# Patient Record
Sex: FEMALE | Race: WHITE | NOT HISPANIC OR LATINO | Employment: STUDENT | URBAN - METROPOLITAN AREA
[De-identification: names, ages, dates, MRNs, and addresses within clinical notes are randomized per-mention and may not be internally consistent; named-entity substitution may affect disease eponyms.]

---
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
## 2019-01-11 ENCOUNTER — OFFICE VISIT (OUTPATIENT)
Dept: FAMILY MEDICINE | Facility: CLINIC | Age: 19
End: 2019-01-11
Payer: MEDICAID

## 2019-01-11 ENCOUNTER — ANCILLARY PROCEDURE (OUTPATIENT)
Dept: GENERAL RADIOLOGY | Facility: CLINIC | Age: 19
End: 2019-01-11
Payer: MEDICAID

## 2019-01-11 VITALS
HEIGHT: 68 IN | SYSTOLIC BLOOD PRESSURE: 127 MMHG | HEART RATE: 100 BPM | TEMPERATURE: 98.9 F | BODY MASS INDEX: 35.22 KG/M2 | OXYGEN SATURATION: 95 % | WEIGHT: 232.4 LBS | DIASTOLIC BLOOD PRESSURE: 82 MMHG

## 2019-01-11 DIAGNOSIS — R05.9 COUGH: ICD-10-CM

## 2019-01-11 DIAGNOSIS — J18.9 WALKING PNEUMONIA: Primary | ICD-10-CM

## 2019-01-11 DIAGNOSIS — Z91.09 ALLERGY TO MOLD SPORES: ICD-10-CM

## 2019-01-11 PROCEDURE — 71046 X-RAY EXAM CHEST 2 VIEWS: CPT | Mod: FY

## 2019-01-11 PROCEDURE — 99214 OFFICE O/P EST MOD 30 MIN: CPT | Performed by: FAMILY MEDICINE

## 2019-01-11 RX ORDER — FLUTICASONE PROPIONATE 50 MCG
1 SPRAY, SUSPENSION (ML) NASAL DAILY
Qty: 15.8 ML | Refills: 3 | Status: SHIPPED | OUTPATIENT
Start: 2019-01-11 | End: 2019-05-17

## 2019-01-11 RX ORDER — AZITHROMYCIN 250 MG/1
TABLET, FILM COATED ORAL
Qty: 6 TABLET | Refills: 0 | Status: SHIPPED | OUTPATIENT
Start: 2019-01-11 | End: 2019-05-17

## 2019-01-11 RX ORDER — CODEINE PHOSPHATE AND GUAIFENESIN 10; 100 MG/5ML; MG/5ML
1 SOLUTION ORAL EVERY 4 HOURS PRN
Qty: 118 ML | Refills: 0 | Status: SHIPPED | OUTPATIENT
Start: 2019-01-11 | End: 2019-05-17

## 2019-01-11 ASSESSMENT — MIFFLIN-ST. JEOR: SCORE: 1877.66

## 2019-01-11 NOTE — PROGRESS NOTES
SUBJECTIVE:   Rachelle Mckeon is a 18 year old female who presents to clinic today for the following health issues:      ENT Symptoms             Symptoms: cc Present Absent Comment   Fever/Chills  x  Chills    Fatigue  x     Muscle Aches  x  Slight    Eye Irritation  x  Slight irritation    Sneezing  x     Nasal Alvin/Drg  x  Nasal congestion and drainage    Sinus Pressure/Pain   x    Loss of smell   x    Dental pain   x    Sore Throat  x     Swollen Glands   x    Ear Pain/Fullness   x    Cough  x     Wheeze  x     Chest Pain  x  Slight pain    Shortness of breath  x     Rash   x    Other  x  Slight nausea after coughing      Symptom duration:  a little over a month    Symptom severity:  mild    Treatments tried:  Nyquil and OTC flu and cold medication Robitussin with codeine    Contacts:  none      Symptoms ongoing for over a month  Cough  Chest and back pain when coughing   Some shortness of breath   No wheezing   No hemoptysis    No asthma  Mold allergy - on singulair.   Recently ran out of flonase - stopped taking it a few days ago     Review of systems:    No fever/chills  No facial pain   No ear pain  Some throat pain   No rash   Normal appetite  No n/v   No diarrhea/constipation       Problem list and histories reviewed & adjusted, as indicated.  Additional history: as documented      Patient Active Problem List   Diagnosis     Health Care Home     Moderate major depression (H)     Anxiety     Mood disorder (H)     Nonspecific finding on examination of urine     Migraine with aura and without status migrainosus, not intractable     Current Outpatient Medications   Medication     amitriptyline (ELAVIL) 25 MG tablet     amphetamine-dextroamphetamine (ADDERALL XR) 20 MG per capsule     Biotin 29011 MCG TABS     Cholecalciferol (VITAMIN D PO)     MONO-LINYAH 0.25-35 MG-MCG per tablet     montelukast (SINGULAIR) 10 MG tablet     Fluticasone Propionate (FLONASE NA)     IBUPROFEN PO     SUMAtriptan (IMITREX) 25 MG  "tablet     traZODone (DESYREL) 50 MG tablet     No current facility-administered medications for this visit.         Allergies   Allergen Reactions     Mold      Wool Fiber Hives       /82 (BP Location: Right arm, Patient Position: Sitting, Cuff Size: Adult Regular)   Pulse 100   Temp 98.9  F (37.2  C) (Tympanic)   Ht 1.727 m (5' 8\")   Wt 105.4 kg (232 lb 6.4 oz)   SpO2 95%   BMI 35.34 kg/m      GENERAL - Pt is alert and oriented in no acute distress.  Affect is appropriate. Good eye contact. Looks tired   HEET - Head is normocephalic, atraumatic.    PERRLA,EEMI. Conjunctiva are free of icterus or erythema.    TMs bilaterally normal.   Oropharynx free of masses and lesions, no tonsillar exudate or petechiae.    NECK - Neck is supple w/o LA or thyromegaly  RESPIRATORY - Clear to auscultation bilaterally.  No wheezing noted  CV - RRR, no murmurs, rubs, gallops.   EXTREM - No edema.      chest x-ray   I independently visualized the xray and my findings were negative.   Radiology review pending.        Assessment/Plan -    (J18.9) Walking pneumonia  (primary encounter diagnosis)  Comment: due to ongoing symptoms and low sats, will treat for walking pneumonia with zpak. Discussed risks/benefits/side effects with the patient of taking robitussin AC and she would like to have some in case the cough is bad at night. Return to clinic if symptoms persist/change/worsen. The patient indicates understanding of these issues and agrees with the plan.   Plan: azithromycin (ZITHROMAX) 250 MG tablet,         guaiFENesin-codeine (ROBITUSSIN AC) 100-10         MG/5ML solution            (R05) Cough  Comment:   Plan: XR Chest 2 Views            (Z91.09) Allergy to mold spores  Comment: restart flonase and use throughout the winter season   Plan: fluticasone (FLONASE) 50 MCG/ACT nasal spray            KITTY Olguin MD   "

## 2019-04-16 DIAGNOSIS — Z30.41 ORAL CONTRACEPTIVE PILL SURVEILLANCE: ICD-10-CM

## 2019-04-16 RX ORDER — NORGESTIMATE AND ETHINYL ESTRADIOL 0.25-0.035
KIT ORAL
Qty: 84 TABLET | Refills: 3 | Status: SHIPPED | OUTPATIENT
Start: 2019-04-16 | End: 2019-12-18

## 2019-04-16 NOTE — TELEPHONE ENCOUNTER
"MONO-LINYAH 0.25-35 MG-MCG TABS      Last Written Prescription Date:  8/1/18  Last Fill Quantity: 84,   # refills: 3  Last Office Visit: 1/11/19  Future Office visit:       Requested Prescriptions   Pending Prescriptions Disp Refills     MONO-LINYAH 0.25-35 MG-MCG tablet [Pharmacy Med Name: MONO-LINYAH 0.25-35 MG-MCG TABS] 84 tablet 3     Sig: TAKE ONE TABLET BY MOUTH EVERY DAY MAY SKIP PLACEBO TABLETS.       Contraceptives Protocol Passed - 4/16/2019  1:18 PM        Passed - Patient is not a current smoker if age is 35 or older        Passed - Recent (12 mo) or future (30 days) visit within the authorizing provider's specialty     Patient had office visit in the last 12 months or has a visit in the next 30 days with authorizing provider or within the authorizing provider's specialty.  See \"Patient Info\" tab in inbasket, or \"Choose Columns\" in Meds & Orders section of the refill encounter.              Passed - Medication is active on med list        Passed - No active pregnancy on record        Passed - No positive pregnancy test in past 12 months          "

## 2019-05-17 ENCOUNTER — OFFICE VISIT (OUTPATIENT)
Dept: FAMILY MEDICINE | Facility: CLINIC | Age: 19
End: 2019-05-17
Payer: COMMERCIAL

## 2019-05-17 VITALS
RESPIRATION RATE: 14 BRPM | WEIGHT: 230.3 LBS | DIASTOLIC BLOOD PRESSURE: 83 MMHG | BODY MASS INDEX: 34.9 KG/M2 | HEART RATE: 85 BPM | HEIGHT: 68 IN | OXYGEN SATURATION: 95 % | TEMPERATURE: 97.8 F | SYSTOLIC BLOOD PRESSURE: 125 MMHG

## 2019-05-17 DIAGNOSIS — N76.0 BACTERIAL VAGINOSIS: ICD-10-CM

## 2019-05-17 DIAGNOSIS — N30.00 ACUTE CYSTITIS WITHOUT HEMATURIA: Primary | ICD-10-CM

## 2019-05-17 DIAGNOSIS — Z91.09 ALLERGY TO MOLD SPORES: ICD-10-CM

## 2019-05-17 DIAGNOSIS — J30.1 SEASONAL ALLERGIC RHINITIS DUE TO POLLEN: ICD-10-CM

## 2019-05-17 DIAGNOSIS — H69.93 DYSFUNCTION OF BOTH EUSTACHIAN TUBES: ICD-10-CM

## 2019-05-17 DIAGNOSIS — B96.89 BACTERIAL VAGINOSIS: ICD-10-CM

## 2019-05-17 DIAGNOSIS — J98.01 ACUTE BRONCHOSPASM: ICD-10-CM

## 2019-05-17 DIAGNOSIS — N89.8 VAGINAL DISCHARGE: ICD-10-CM

## 2019-05-17 DIAGNOSIS — R35.0 URINARY FREQUENCY: ICD-10-CM

## 2019-05-17 DIAGNOSIS — F32.1 MODERATE MAJOR DEPRESSION (H): ICD-10-CM

## 2019-05-17 LAB
ALBUMIN UR-MCNC: 30 MG/DL
APPEARANCE UR: CLEAR
BACTERIA #/AREA URNS HPF: ABNORMAL /HPF
BILIRUB UR QL STRIP: NEGATIVE
COLOR UR AUTO: YELLOW
GLUCOSE UR STRIP-MCNC: NEGATIVE MG/DL
HGB UR QL STRIP: ABNORMAL
KETONES UR STRIP-MCNC: NEGATIVE MG/DL
LEUKOCYTE ESTERASE UR QL STRIP: NEGATIVE
NITRATE UR QL: NEGATIVE
NON-SQ EPI CELLS #/AREA URNS LPF: ABNORMAL /LPF
PH UR STRIP: 6 PH (ref 5–7)
RBC #/AREA URNS AUTO: ABNORMAL /HPF
SOURCE: ABNORMAL
SP GR UR STRIP: 1.02 (ref 1–1.03)
SPECIMEN SOURCE: ABNORMAL
UROBILINOGEN UR STRIP-ACNC: 1 EU/DL (ref 0.2–1)
WBC #/AREA URNS AUTO: ABNORMAL /HPF
WET PREP SPEC: ABNORMAL

## 2019-05-17 PROCEDURE — 87210 SMEAR WET MOUNT SALINE/INK: CPT | Performed by: FAMILY MEDICINE

## 2019-05-17 PROCEDURE — 87086 URINE CULTURE/COLONY COUNT: CPT | Performed by: FAMILY MEDICINE

## 2019-05-17 PROCEDURE — 99214 OFFICE O/P EST MOD 30 MIN: CPT | Performed by: FAMILY MEDICINE

## 2019-05-17 PROCEDURE — 81001 URINALYSIS AUTO W/SCOPE: CPT | Performed by: FAMILY MEDICINE

## 2019-05-17 RX ORDER — CETIRIZINE HYDROCHLORIDE 10 MG/1
10 TABLET ORAL DAILY
Qty: 30 TABLET | Refills: 3 | Status: SHIPPED | OUTPATIENT
Start: 2019-05-17 | End: 2019-07-31

## 2019-05-17 RX ORDER — PREDNISONE 20 MG/1
40 TABLET ORAL DAILY
Qty: 10 TABLET | Refills: 0 | Status: SHIPPED | OUTPATIENT
Start: 2019-05-17 | End: 2019-06-05

## 2019-05-17 RX ORDER — FLUTICASONE PROPIONATE 50 MCG
1 SPRAY, SUSPENSION (ML) NASAL DAILY
Qty: 15.8 ML | Refills: 3 | Status: SHIPPED | OUTPATIENT
Start: 2019-05-17

## 2019-05-17 RX ORDER — SULFAMETHOXAZOLE/TRIMETHOPRIM 800-160 MG
1 TABLET ORAL 2 TIMES DAILY
Qty: 6 TABLET | Refills: 0 | Status: SHIPPED | OUTPATIENT
Start: 2019-05-17 | End: 2019-06-05

## 2019-05-17 RX ORDER — ALBUTEROL SULFATE 90 UG/1
2 AEROSOL, METERED RESPIRATORY (INHALATION) EVERY 6 HOURS
Qty: 8.5 G | Refills: 0 | Status: SHIPPED | OUTPATIENT
Start: 2019-05-17

## 2019-05-17 ASSESSMENT — ANXIETY QUESTIONNAIRES
2. NOT BEING ABLE TO STOP OR CONTROL WORRYING: NEARLY EVERY DAY
6. BECOMING EASILY ANNOYED OR IRRITABLE: MORE THAN HALF THE DAYS
3. WORRYING TOO MUCH ABOUT DIFFERENT THINGS: NEARLY EVERY DAY
5. BEING SO RESTLESS THAT IT IS HARD TO SIT STILL: SEVERAL DAYS
GAD7 TOTAL SCORE: 16
7. FEELING AFRAID AS IF SOMETHING AWFUL MIGHT HAPPEN: MORE THAN HALF THE DAYS
1. FEELING NERVOUS, ANXIOUS, OR ON EDGE: NEARLY EVERY DAY

## 2019-05-17 ASSESSMENT — PATIENT HEALTH QUESTIONNAIRE - PHQ9
SUM OF ALL RESPONSES TO PHQ QUESTIONS 1-9: 14
5. POOR APPETITE OR OVEREATING: MORE THAN HALF THE DAYS

## 2019-05-17 ASSESSMENT — MIFFLIN-ST. JEOR: SCORE: 1868.13

## 2019-05-17 NOTE — PROGRESS NOTES
SUBJECTIVE:   Rachelle Mckeon is a 18 year old female who presents to clinic today for the following   health issues:      ENT Symptoms             Symptoms: cc Present Absent Comment   Fever/Chills   x    Fatigue  x  Slight    Muscle Aches  x  Neck up    Eye Irritation  x  Irritation    Sneezing  x     Nasal Alvin/Drg  x  Nasal drainage    Sinus Pressure/Pain  x  Slight pressure    Loss of smell  x  On and off    Dental pain   x    Sore Throat  x  Slight    Swollen Glands   x    Ear Pain/Fullness  x  Bilateral - left ear is worse    Cough  x  Slight    Wheeze   x    Chest Pain   x    Shortness of breath  x     Rash   x    Other  x  Waking with dry mouth    vomited on Wednesday      Symptom duration:  past month    Symptom severity:  moderate    Treatments tried:  none    Contacts:  works with public      Allergies - mold/wool  Taking singulair and intermittent flonase   Denies asthma  Is coughing and short of breath  No f/c     Non smoker     URINARY TRACT SYMPTOMS  Onset: since Tuesday     Description:   Painful urination (Dysuria): YES- slight   Blood in urine (Hematuria): no   Delay in urine (Hesitency): YES- sometimes    Frequency- yes     Intensity: mild    Progression of Symptoms:  same    Accompanying Signs & Symptoms:  Fever/chills: no   Flank pain YES- usual for patient per patient   Nausea and vomiting: YES- vomited on Wednesday   Any vaginal symptoms: vaginal itching - slight. No odor. No discharge   Abdominal/Pelvic Pain: YES- abdominal     History:   History of frequent UTI's: no   History of kidney stones: no   Sexually Active: no   Possibility of pregnancy: No    Precipitating factors:   Therapies Tried and outcome: Urinestat     Review of systems:  No f/c   No n/v   No flank pain  No diarrhea/constipation     Additional history: as documented    Patient Active Problem List   Diagnosis     Health Care Home     Moderate major depression (H)     Anxiety     Mood disorder (H)     Nonspecific finding on  "examination of urine     Migraine with aura and without status migrainosus, not intractable     Current Outpatient Medications   Medication     amitriptyline (ELAVIL) 25 MG tablet     amphetamine-dextroamphetamine (ADDERALL XR) 20 MG per capsule     Biotin 15461 MCG TABS     Cholecalciferol (VITAMIN D PO)     fluticasone (FLONASE) 50 MCG/ACT nasal spray     MONO-LINYAH 0.25-35 MG-MCG tablet     montelukast (SINGULAIR) 10 MG tablet     traZODone (DESYREL) 50 MG tablet     Fluticasone Propionate (FLONASE NA)     IBUPROFEN PO     SUMAtriptan (IMITREX) 25 MG tablet     No current facility-administered medications for this visit.         Allergies   Allergen Reactions     Mold      Wool Fiber Hives       /83 (BP Location: Right arm, Patient Position: Sitting, Cuff Size: Adult Large)   Pulse 85   Temp 97.8  F (36.6  C) (Tympanic)   Resp 14   Ht 1.727 m (5' 8\")   Wt 104.5 kg (230 lb 4.8 oz)   SpO2 95%   BMI 35.02 kg/m    GENERAL - Pt is alert and oriented in no acute distress.  Affect is appropriate. Good eye contact.  HEET - Head is normocephalic, atraumatic.  PERRLA,EEMI. Conjunctiva are free of icterus or erythema.    TMs bilaterally normal.  Oropharynx free of masses and lesions, no tonsillar exudate or petechiae.    NECK - Neck is supple w/o LA or thyromegaly  RESPIRATORY - bilateral end expiratory wheezes throughout noted  CV - RRR, no murmurs, rubs, gallops.  ABD - +BS, soft, nontender, no rebound, no guarding. No palpable organomegaly.  BACK - No CVA tenderness.       Results for orders placed or performed in visit on 05/17/19   UA reflex to Microscopic and Culture   Result Value Ref Range    Color Urine Yellow     Appearance Urine Clear     Glucose Urine Negative NEG^Negative mg/dL    Bilirubin Urine Negative NEG^Negative    Ketones Urine Negative NEG^Negative mg/dL    Specific Gravity Urine 1.025 1.003 - 1.035    Blood Urine Moderate (A) NEG^Negative    pH Urine 6.0 5.0 - 7.0 pH    Protein Albumin " Urine 30 (A) NEG^Negative mg/dL    Urobilinogen Urine 1.0 0.2 - 1.0 EU/dL    Nitrite Urine Negative NEG^Negative    Leukocyte Esterase Urine Negative NEG^Negative    Source Midstream Urine    Urine Microscopic   Result Value Ref Range    WBC Urine 5-10 (A) OTO5^0 - 5 /HPF    RBC Urine 2-5 (A) OTO2^O - 2 /HPF    Squamous Epithelial /LPF Urine Moderate (A) FEW^Few /LPF    Bacteria Urine Moderate (A) NEG^Negative /HPF   Wet prep   Result Value Ref Range    Specimen Description Vagina     Wet Prep No Trichomonas seen     Wet Prep No yeast seen     Wet Prep Moderate  Clue cells seen   (A)     Wet Prep Moderate  WBC'S seen            Assessment/Plan -    (N30.00) Acute cystitis without hematuria  (primary encounter diagnosis)  Comment: discussed diagnosis and treatment. Urine culture ordered. The patient indicates understanding of these issues and agrees with the plan.   Plan: sulfamethoxazole-trimethoprim (BACTRIM         DS/SEPTRA DS) 800-160 MG tablet            (R35.0) Urinary frequency  Comment:   Plan: UA reflex to Microscopic and Culture, Urine         Microscopic            (N89.8) Vaginal discharge  Comment:   Plan: Wet prep            (F32.1) Moderate major depression (H)  Comment: stable   Plan:     (J30.1) Seasonal allergic rhinitis due to pollen  Comment: I think her facial symptoms are due to allergies. Start zyrtec. Continue singulair. Use flonase regularly. The patient indicates understanding of these issues and agrees with the plan.   Plan: cetirizine (ZYRTEC) 10 MG tablet            (H69.83) Dysfunction of both eustachian tubes  Comment:  Should improve with allergy treatment   Plan:     (J98.01) Acute bronchospasm  Comment: will treat with prednisone burst - Discussed risks/benefits/side effects with the patient. Inhaler given for prn use. Return to clinic if symptoms persist/change/worsen   Plan: predniSONE (DELTASONE) 20 MG tablet, albuterol         (PROAIR HFA/PROVENTIL HFA/VENTOLIN HFA) 108 (90         Base) MCG/ACT inhaler            (Z91.09) Allergy to mold spores  Comment:   Plan: fluticasone (FLONASE) 50 MCG/ACT nasal spray            (N76.0,  B96.89) Bacterial vaginosis  Comment: discussed that there are clue cells. But she also has a significant UTI right now. Would recommend that we treat for UTI first and if vaginal symptoms continue, will treat for BV. She will let me know next week how she is feeling.   Plan:     KITTY Olguin MD

## 2019-05-18 LAB
BACTERIA SPEC CULT: NORMAL
SPECIMEN SOURCE: NORMAL

## 2019-05-18 ASSESSMENT — ANXIETY QUESTIONNAIRES: GAD7 TOTAL SCORE: 16

## 2019-06-05 ENCOUNTER — OFFICE VISIT (OUTPATIENT)
Dept: FAMILY MEDICINE | Facility: CLINIC | Age: 19
End: 2019-06-05
Payer: COMMERCIAL

## 2019-06-05 VITALS
RESPIRATION RATE: 18 BRPM | HEART RATE: 92 BPM | SYSTOLIC BLOOD PRESSURE: 129 MMHG | DIASTOLIC BLOOD PRESSURE: 82 MMHG | BODY MASS INDEX: 35.33 KG/M2 | OXYGEN SATURATION: 96 % | HEIGHT: 68 IN | TEMPERATURE: 98.2 F | WEIGHT: 233.1 LBS

## 2019-06-05 DIAGNOSIS — J01.00 ACUTE NON-RECURRENT MAXILLARY SINUSITIS: ICD-10-CM

## 2019-06-05 DIAGNOSIS — R07.0 THROAT PAIN: Primary | ICD-10-CM

## 2019-06-05 LAB
DEPRECATED S PYO AG THROAT QL EIA: NORMAL
SPECIMEN SOURCE: NORMAL

## 2019-06-05 PROCEDURE — 87880 STREP A ASSAY W/OPTIC: CPT | Performed by: FAMILY MEDICINE

## 2019-06-05 PROCEDURE — 87070 CULTURE OTHR SPECIMN AEROBIC: CPT | Performed by: FAMILY MEDICINE

## 2019-06-05 PROCEDURE — 99213 OFFICE O/P EST LOW 20 MIN: CPT | Performed by: FAMILY MEDICINE

## 2019-06-05 RX ORDER — AMOXICILLIN 500 MG/1
500 CAPSULE ORAL 3 TIMES DAILY
Qty: 30 CAPSULE | Refills: 0 | Status: SHIPPED | OUTPATIENT
Start: 2019-06-05 | End: 2019-07-31

## 2019-06-05 ASSESSMENT — MIFFLIN-ST. JEOR: SCORE: 1880.83

## 2019-06-05 ASSESSMENT — PAIN SCALES - GENERAL: PAINLEVEL: NO PAIN (0)

## 2019-06-05 NOTE — PROGRESS NOTES
SUBJECTIVE:                                                    Rachelle Mckeon is a 18 year old female who presents to clinic today for the following health issues:    ENT Symptoms             Symptoms: cc Present Absent Comment   Fever/Chills  x  Chills slightly   Fatigue  x     Muscle Aches  x     Eye Irritation  x  Itchy eyes   Sneezing  x     Nasal Alvin/Drg  x     Sinus Pressure/Pain  x     Loss of smell  x     Dental pain   x    Sore Throat  x     Swollen Glands  x     Ear Pain/Fullness x x     Cough  x     Wheeze  x     Chest Pain  x  slightly   Shortness of breath   x    Rash   x    Other x x  headache     Symptom duration:  Four days   Symptom severity:  moderate   Treatments tried:  none, last month she was given an antibiotic due to having sinus pressure.    Contacts:  mom and step dad     Problem list and histories reviewed & adjusted, as indicated.  Additional history:     Patient Active Problem List   Diagnosis     Health Care Home     Moderate major depression (H)     Anxiety     Mood disorder (H)     Migraine with aura and without status migrainosus, not intractable     Past Surgical History:   Procedure Laterality Date     NO HISTORY OF SURGERY         Social History     Tobacco Use     Smoking status: Passive Smoke Exposure - Never Smoker     Smokeless tobacco: Never Used     Tobacco comment: Stepmother- outside and every other weekend with dad.    Substance Use Topics     Alcohol use: No     History reviewed. No pertinent family history.      Current Outpatient Medications   Medication Sig Dispense Refill     albuterol (PROAIR HFA/PROVENTIL HFA/VENTOLIN HFA) 108 (90 Base) MCG/ACT inhaler Inhale 2 puffs into the lungs every 6 hours 8.5 g 0     amitriptyline (ELAVIL) 25 MG tablet        amphetamine-dextroamphetamine (ADDERALL XR) 20 MG per capsule        Biotin 82146 MCG TABS        cetirizine (ZYRTEC) 10 MG tablet Take 1 tablet (10 mg) by mouth daily 30 tablet 3     Cholecalciferol (VITAMIN D PO) Take  "4,000 Units by mouth daily       fluticasone (FLONASE) 50 MCG/ACT nasal spray Spray 1 spray into both nostrils daily 15.8 mL 3     Fluticasone Propionate (FLONASE NA)        MONO-LINYAH 0.25-35 MG-MCG tablet TAKE ONE TABLET BY MOUTH EVERY DAY MAY SKIP PLACEBO TABLETS. 84 tablet 3     montelukast (SINGULAIR) 10 MG tablet Take 1 tablet (10 mg) by mouth At Bedtime 30 tablet 5     SUMAtriptan (IMITREX) 25 MG tablet Take 1-2 tablets (25-50 mg) by mouth at onset of headache for migraine May repeat in 2 hours. Max 8 tablets/24 hours. 18 tablet 1     traZODone (DESYREL) 50 MG tablet Take 50 mg by mouth nightly as needed for sleep       IBUPROFEN PO Take 400 mg by mouth every 6 hours as needed for moderate pain       Allergies   Allergen Reactions     Mold      Wool Fiber Hives       ROS:Constitutional, HEENT, cardiovascular, pulmonary, gi and gu systems are negative, except as otherwise noted..    OBJECTIVE:                                 /82 (BP Location: Right arm, Patient Position: Sitting, Cuff Size: Adult Large)   Pulse 92   Temp 98.2  F (36.8  C) (Tympanic)   Resp 18   Ht 1.727 m (5' 8\")   Wt 105.7 kg (233 lb 1.6 oz)   SpO2 96%   BMI 35.44 kg/m   Body mass index is 35.44 kg/m .{GENERAL: healthy, alert, well nourished, well hydrated, no distress  HENT: ear canals- normal; TMs- normal; Nose- normal; Mouth- no ulcers, no lesions  NECK: no tenderness, no adenopathy, no asymmetry, no masses, no stiffness; thyroid- normal to palpation  RESP: lungs clear to auscultation - no rales, no rhonchi, no wheezes  CV: regular rates and rhythm, normal S1 S2, no S3 or S4 and no murmur, no click or rub -  ABDOMEN: soft, no tenderness, no  hepatosplenomegaly, no masses, normal bowel sounds  ASSESSMENT/PLAN:   (R07.0) Throat pain  (primary encounter diagnosis)  Plan: Strep, Rapid Screen, Throat Culture Aerobic         Bacterial      (J01.00) Acute non-recurrent maxillary sinusitis  Comment: ,return to clinic or call    Plan: " amoxicillin (AMOXIL) 500 MG capsule

## 2019-06-07 LAB
BACTERIA SPEC CULT: NORMAL
Lab: NORMAL
SPECIMEN SOURCE: NORMAL

## 2019-06-09 ENCOUNTER — HOSPITAL ENCOUNTER (EMERGENCY)
Facility: CLINIC | Age: 19
Discharge: HOME | End: 2019-06-09
Attending: PHYSICIAN ASSISTANT | Admitting: PHYSICIAN ASSISTANT
Payer: COMMERCIAL

## 2019-06-09 ENCOUNTER — APPOINTMENT (OUTPATIENT)
Dept: GENERAL RADIOLOGY | Facility: CLINIC | Age: 19
End: 2019-06-09
Attending: PHYSICIAN ASSISTANT
Payer: COMMERCIAL

## 2019-06-09 VITALS
RESPIRATION RATE: 16 BRPM | TEMPERATURE: 97.8 F | OXYGEN SATURATION: 97 % | SYSTOLIC BLOOD PRESSURE: 138 MMHG | WEIGHT: 200 LBS | BODY MASS INDEX: 30.31 KG/M2 | HEART RATE: 87 BPM | DIASTOLIC BLOOD PRESSURE: 86 MMHG | HEIGHT: 68 IN

## 2019-06-09 DIAGNOSIS — S91.331A PUNCTURE WOUND OF RIGHT FOOT, INITIAL ENCOUNTER: ICD-10-CM

## 2019-06-09 PROCEDURE — 99213 OFFICE O/P EST LOW 20 MIN: CPT | Mod: Z6 | Performed by: PHYSICIAN ASSISTANT

## 2019-06-09 PROCEDURE — G0463 HOSPITAL OUTPT CLINIC VISIT: HCPCS | Performed by: PHYSICIAN ASSISTANT

## 2019-06-09 PROCEDURE — 73630 X-RAY EXAM OF FOOT: CPT | Mod: RT

## 2019-06-09 ASSESSMENT — MIFFLIN-ST. JEOR: SCORE: 1730.69

## 2019-06-09 ASSESSMENT — ENCOUNTER SYMPTOMS
CONSTITUTIONAL NEGATIVE: 1
WOUND: 1

## 2019-06-09 NOTE — ED PROVIDER NOTES
History     Chief Complaint   Patient presents with     Foot Pain     stepped on a nail last night     HPI  April Linda is a 18 year old female who presents with complaints of puncture wound to right foot last night.  Patient states she accidentally stepped on a nail that had fallen into the carpet from trim in her house.  She was barefoot at the time.  Patient's last tetanus was in 2012.  She has been ambulating without difficulties.  Patient complains of some mild pain to the area.  No fevers or chills.      Allergies:  Allergies   Allergen Reactions     Mold      Wool Fiber Hives       Problem List:    Patient Active Problem List    Diagnosis Date Noted     Migraine with aura and without status migrainosus, not intractable 12/06/2017     Priority: Medium     Mood disorder (H) 12/07/2015     Priority: Medium     Health Care Home 02/28/2014     Priority: Medium     EMERGENCY CARE PLAN  February 28, 2014: No current Care Coordination follow up planned. Please refer if Care Coordination services are needed.    Presenting Problem Signs and Symptoms Treatment Plan   Questions or concerns   during clinic hours   I will call my clinic directly:  [address] [address]  [phone].    Questions or concerns outside clinic hours   I will call the 24 hour nurse line at   [phone] or [facility].   Need to schedule an appointment   I will call the 24 hour scheduling team at [phone] or my clinic directly at [phone].    Same day treatment     I will call my clinic first, nurse line if after hours, urgent care and express care if needed.   Clinic care coordination services (regular clinic hours)     I will call a clinic care coordinator directly:     Mateo Hooks RN  Mon, Tues, Fri - [phone]  Wed, Thurs - [phone]    Elaina Fuentes SW:    [phone]    Or call my clinic at [phone] and ask to speak with care coordination.   Crisis Services: Behavioral or  "Mental Health  C[facility]is [facility] 24 Hour Phone Line  [phone]    [facility] 24 Hour Crisis Services  [phone]    [facility] [phone]    [facility]   [phone]       Emergency treatment -- Immediately    [facility] 911          Moderate major depression (H) 02/28/2014     Priority: Medium     Anxiety 02/28/2014     Priority: Medium        Past Medical History:    Past Medical History:   Diagnosis Date     NO ACTIVE PROBLEMS        Past Surgical History:    Past Surgical History:   Procedure Laterality Date     NO HISTORY OF SURGERY         Family History:    No family history on file.    Social History:  Marital Status:  Single [1]  Social History     Tobacco Use     Smoking status: Passive Smoke Exposure - Never Smoker     Smokeless tobacco: Never Used     Tobacco comment: Stepmother- outside and every other weekend with dad.    Substance Use Topics     Alcohol use: No     Drug use: No        Medications:      albuterol (PROAIR HFA/PROVENTIL HFA/VENTOLIN HFA) 108 (90 Base) MCG/ACT inhaler   amitriptyline (ELAVIL) 25 MG tablet   amoxicillin (AMOXIL) 500 MG capsule   amphetamine-dextroamphetamine (ADDERALL XR) 20 MG per capsule   Biotin 54609 MCG TABS   cetirizine (ZYRTEC) 10 MG tablet   Cholecalciferol (VITAMIN D PO)   fluticasone (FLONASE) 50 MCG/ACT nasal spray   Fluticasone Propionate (FLONASE NA)   IBUPROFEN PO   MONO-LINYAH 0.25-35 MG-MCG tablet   montelukast (SINGULAIR) 10 MG tablet   SUMAtriptan (IMITREX) 25 MG tablet   traZODone (DESYREL) 50 MG tablet         Review of Systems   Constitutional: Negative.    Musculoskeletal:        Puncture wound to right foot   Skin: Positive for wound.   All other systems reviewed and are negative.      Physical Exam   BP: 138/86  Pulse: 87  Temp: 97.8  F (36.6  C)  Resp: 16  Height: 172.7 cm (5' 8\")  Weight: 90.7 kg (200 lb)(stated)  SpO2: 97 %      Physical Exam   Constitutional: She appears well-developed and " well-nourished. No distress.   HENT:   Head: Normocephalic and atraumatic.   Eyes: Conjunctivae are normal.   Neck: Neck supple.   Pulmonary/Chest: Effort normal.   Musculoskeletal: Normal range of motion.        Right foot: There is tenderness. There is normal range of motion, no bony tenderness, no swelling, normal capillary refill, no crepitus, no deformity and no laceration.   Puncture wound to plantar aspect of right foot overlying ball of foot.  Mildly tender to palpation.  No surrounding erythema or swelling.  Normal ROM.  Sensation and strength are normal.   Neurological: She is alert.   Skin: Skin is warm and dry.       ED Course        Procedures      Results for orders placed or performed during the hospital encounter of 06/09/19 (from the past 24 hour(s))   Foot  XR, G/E 3 views, right    Narrative    XR FOOT RT G/E 3 VW 6/9/2019 5:29 PM    HISTORY: Injury.     COMPARISON: None.      Impression    IMPRESSION: No evidence of acute fracture or malalignment. No  radiopaque foreign bodies appreciated.     DAMI REY MD       Medications - No data to display    Assessments & Plan (with Medical Decision Making)     Pt is a 18 year old female who presents with complaints of puncture wound to right foot last night.  Patient states she accidentally stepped on a nail that had fallen into the carpet from trim in her house.  She was barefoot at the time.  Patient's last tetanus was in 2012.  She has been ambulating without difficulties.  Pt is afebrile on arrival.  Exam as above.  X-rays of right foot were negative for foreign body or acute pathology.  Discussed results with patient.  No evidence of infection.  Pt is otherwise healthy.  Discussed with patient and will not start on prophylactic antibiotics at this time.  Will monitor for signs and symptoms of infection.  Return precautions were reviewed.  Hand-outs were provided.    Patient was instructed to follow-up with PCP if no improvement in 5-7 days for  continued care and management or sooner if new or worsening symptoms.  She is to return to the ED for persistent and/or worsening symptoms.  Patient expressed understanding of the diagnosis and plan and was discharged home in good condition.    I have reviewed the nursing notes.    I have reviewed the findings, diagnosis, plan and need for follow up with the patient.       Medication List      There are no discharge medications for this visit.         Final diagnoses:   Puncture wound of right foot, initial encounter       6/9/2019   [facility] EMERGENCY DEPARTMENT      Disclaimer:  This note consists of symbols derived from keyboarding, dictation and/or voice recognition software.  As a result, there may be errors in the script that have gone undetected.  Please consider this when interpreting information found in this chart.     Alva Cleveland PA-C  06/09/19 1371

## 2019-06-09 NOTE — LETTER
June 9, 2019      To Whom It May Concern:      Rachelle Mckeon was seen in our Emergency Department today, 06/09/19.  Please excuse from work tomorrow.  Thank you.      Sincerely,        Alva Cleveland PA-C

## 2019-07-31 ENCOUNTER — OFFICE VISIT (OUTPATIENT)
Dept: FAMILY MEDICINE | Facility: CLINIC | Age: 19
End: 2019-07-31
Payer: COMMERCIAL

## 2019-07-31 VITALS
SYSTOLIC BLOOD PRESSURE: 120 MMHG | WEIGHT: 237.7 LBS | HEIGHT: 68 IN | TEMPERATURE: 98.3 F | BODY MASS INDEX: 36.03 KG/M2 | HEART RATE: 93 BPM | DIASTOLIC BLOOD PRESSURE: 79 MMHG

## 2019-07-31 DIAGNOSIS — F39 MOOD DISORDER (H): ICD-10-CM

## 2019-07-31 DIAGNOSIS — J30.1 SEASONAL ALLERGIC RHINITIS DUE TO POLLEN: ICD-10-CM

## 2019-07-31 DIAGNOSIS — B96.89 BV (BACTERIAL VAGINOSIS): Primary | ICD-10-CM

## 2019-07-31 DIAGNOSIS — N76.0 BV (BACTERIAL VAGINOSIS): Primary | ICD-10-CM

## 2019-07-31 DIAGNOSIS — N89.8 VAGINAL ITCHING: ICD-10-CM

## 2019-07-31 DIAGNOSIS — F32.1 MODERATE MAJOR DEPRESSION (H): ICD-10-CM

## 2019-07-31 DIAGNOSIS — R30.0 DYSURIA: ICD-10-CM

## 2019-07-31 LAB
ALBUMIN UR-MCNC: NEGATIVE MG/DL
APPEARANCE UR: CLEAR
BACTERIA #/AREA URNS HPF: ABNORMAL /HPF
BILIRUB UR QL STRIP: NEGATIVE
COLOR UR AUTO: YELLOW
GLUCOSE UR STRIP-MCNC: NEGATIVE MG/DL
HGB UR QL STRIP: ABNORMAL
KETONES UR STRIP-MCNC: NEGATIVE MG/DL
LEUKOCYTE ESTERASE UR QL STRIP: NEGATIVE
NITRATE UR QL: NEGATIVE
PH UR STRIP: 5.5 PH (ref 5–7)
RBC #/AREA URNS AUTO: ABNORMAL /HPF
SOURCE: ABNORMAL
SP GR UR STRIP: >1.03 (ref 1–1.03)
SPECIMEN SOURCE: ABNORMAL
UROBILINOGEN UR STRIP-ACNC: 0.2 EU/DL (ref 0.2–1)
WBC #/AREA URNS AUTO: ABNORMAL /HPF
WET PREP SPEC: ABNORMAL

## 2019-07-31 PROCEDURE — 87210 SMEAR WET MOUNT SALINE/INK: CPT | Performed by: PHYSICIAN ASSISTANT

## 2019-07-31 PROCEDURE — 87086 URINE CULTURE/COLONY COUNT: CPT | Performed by: PHYSICIAN ASSISTANT

## 2019-07-31 PROCEDURE — 99213 OFFICE O/P EST LOW 20 MIN: CPT | Performed by: PHYSICIAN ASSISTANT

## 2019-07-31 PROCEDURE — 81001 URINALYSIS AUTO W/SCOPE: CPT | Performed by: PHYSICIAN ASSISTANT

## 2019-07-31 RX ORDER — METRONIDAZOLE 500 MG/1
500 TABLET ORAL 2 TIMES DAILY
Qty: 14 TABLET | Refills: 0 | Status: SHIPPED | OUTPATIENT
Start: 2019-07-31 | End: 2019-10-24

## 2019-07-31 RX ORDER — CETIRIZINE HYDROCHLORIDE 10 MG/1
10 TABLET ORAL DAILY
Qty: 30 TABLET | Refills: 3 | Status: SHIPPED | OUTPATIENT
Start: 2019-07-31 | End: 2022-08-10

## 2019-07-31 ASSESSMENT — ANXIETY QUESTIONNAIRES
7. FEELING AFRAID AS IF SOMETHING AWFUL MIGHT HAPPEN: NEARLY EVERY DAY
6. BECOMING EASILY ANNOYED OR IRRITABLE: MORE THAN HALF THE DAYS
5. BEING SO RESTLESS THAT IT IS HARD TO SIT STILL: MORE THAN HALF THE DAYS
2. NOT BEING ABLE TO STOP OR CONTROL WORRYING: NEARLY EVERY DAY
1. FEELING NERVOUS, ANXIOUS, OR ON EDGE: NEARLY EVERY DAY
3. WORRYING TOO MUCH ABOUT DIFFERENT THINGS: NEARLY EVERY DAY
GAD7 TOTAL SCORE: 18

## 2019-07-31 ASSESSMENT — MIFFLIN-ST. JEOR: SCORE: 1900.32

## 2019-07-31 ASSESSMENT — PATIENT HEALTH QUESTIONNAIRE - PHQ9
5. POOR APPETITE OR OVEREATING: MORE THAN HALF THE DAYS
SUM OF ALL RESPONSES TO PHQ QUESTIONS 1-9: 18

## 2019-07-31 NOTE — PROGRESS NOTES
SUBJECTIVE:                                                    Rachelle Mckeon is a 18 year old female who presents to clinic today for the following health issues:    URINARY TRACT SYMPTOMS  Onset: 3-4 days ago    Description:   Painful urination (Dysuria): YES  Blood in urine (Hematuria): no, hard to tell she is currently on her period   Delay in urine (Hesitency): no  Frequency: YES     Intensity: moderate    Progression of Symptoms:  worsening    Accompanying Signs & Symptoms:  Fever/chills: no   Flank pain no   Nausea and vomiting: YES- nausea  Any vaginal symptoms: vaginal itching  Abdominal/Pelvic Pain: YES- abdominal pain    History:   History of frequent UTI's: YES- Just recently this year  History of kidney stones: no   Sexually Active: no   Possibility of pregnancy: No    Precipitating factors:   None    Therapies Tried and outcome: Increase fluid intake    Not urgency but is having frequency. Bladder feels achey when she urinates  She feels nausea after going to the bathroom for a few seconds  No discharge    Problem list and histories reviewed & adjusted, as indicated.  Additional history: none    Patient Active Problem List   Diagnosis     Health Care Home     Moderate major depression (H)     Anxiety     Mood disorder (H)     Migraine with aura and without status migrainosus, not intractable     Past Surgical History:   Procedure Laterality Date     NO HISTORY OF SURGERY         Social History     Tobacco Use     Smoking status: Passive Smoke Exposure - Never Smoker     Smokeless tobacco: Never Used     Tobacco comment: Stepmother- outside and every other weekend with dad.    Substance Use Topics     Alcohol use: No     History reviewed. No pertinent family history.        ROS:  Other than noted above, general, HEENT, respiratory, cardiac, MS, and gastrointestinal systems are negative.     OBJECTIVE:                                                    /79   Pulse 93   Temp 98.3  F (36.8  C)  "(Tympanic)   Ht 1.725 m (5' 7.91\")   Wt 107.8 kg (237 lb 11.2 oz)   LMP 07/26/2019 (Exact Date)   BMI 36.23 kg/m   Body mass index is 36.23 kg/m .   GENERAL: healthy, alert, well nourished, well hydrated, no distress  RESP: lungs clear to auscultation - no rales, no rhonchi, no wheezes  CV: regular rates and rhythm, normal S1 S2, no S3 or S4 and no murmur, no click or rub -  ABDOMEN: soft, no tenderness, no  hepatosplenomegaly, no masses, normal bowel sounds  BACK: no CVA tenderness, no paralumbar tenderness  Patient elected for self wet prep    PSYCH: Alert and oriented times 3; speech- coherent , normal rate and volume; able to articulate logical thoughts, able to abstract reason, no tangential thoughts, no hallucinations or delusions, affect- normal, tearful at times     ASSESSMENT/PLAN:                                                      ASSESSMENT/PLAN:      ICD-10-CM    1. BV (bacterial vaginosis) N76.0 metroNIDAZOLE (FLAGYL) 500 MG tablet    B96.89    2. Dysuria R30.0 *UA reflex to Microscopic and Culture ([facility] (except [place] and [place])     Urine Culture Aerobic Bacterial     Urine Microscopic   3. Seasonal allergic rhinitis due to pollen J30.1 cetirizine (ZYRTEC) 10 MG tablet   4. Vaginal itching N89.8 Wet prep   5. Moderate major depression (H) F32.1    6. Mood disorder (H) F39      Will treat BV.   Mood/depression: had been doing better, but very stressed with grad parties this weekend. Mom's will be fine but dad has been making comments about her not graduating on time and this bothers her a lot. She will have big sister coming there for support and will go home if needed. She had brief SI related to this last week but denies any thoughts or plan to hurt herself. She is glad sister is coming.  She sees same psychiatrist, she is not currently seeing therapist but will think about it.      Meera Cordon PA-C   [facility]    "

## 2019-07-31 NOTE — PATIENT INSTRUCTIONS

## 2019-08-01 LAB
BACTERIA SPEC CULT: NORMAL
Lab: NORMAL
SPECIMEN SOURCE: NORMAL

## 2019-08-01 ASSESSMENT — ANXIETY QUESTIONNAIRES: GAD7 TOTAL SCORE: 18

## 2019-10-24 ENCOUNTER — OFFICE VISIT (OUTPATIENT)
Dept: FAMILY MEDICINE | Facility: CLINIC | Age: 19
End: 2019-10-24
Payer: COMMERCIAL

## 2019-10-24 VITALS
BODY MASS INDEX: 35.46 KG/M2 | SYSTOLIC BLOOD PRESSURE: 126 MMHG | OXYGEN SATURATION: 98 % | HEIGHT: 68 IN | TEMPERATURE: 98.4 F | HEART RATE: 92 BPM | DIASTOLIC BLOOD PRESSURE: 80 MMHG | WEIGHT: 234 LBS

## 2019-10-24 DIAGNOSIS — R10.2 PELVIC PAIN IN FEMALE: Primary | ICD-10-CM

## 2019-10-24 DIAGNOSIS — N73.9 PELVIC INFLAMMATORY DISEASE: ICD-10-CM

## 2019-10-24 LAB
SPECIMEN SOURCE: NORMAL
WET PREP SPEC: NORMAL

## 2019-10-24 PROCEDURE — 87491 CHLMYD TRACH DNA AMP PROBE: CPT | Performed by: FAMILY MEDICINE

## 2019-10-24 PROCEDURE — 87210 SMEAR WET MOUNT SALINE/INK: CPT | Performed by: FAMILY MEDICINE

## 2019-10-24 PROCEDURE — 96372 THER/PROPH/DIAG INJ SC/IM: CPT | Performed by: FAMILY MEDICINE

## 2019-10-24 PROCEDURE — 87591 N.GONORRHOEAE DNA AMP PROB: CPT | Performed by: FAMILY MEDICINE

## 2019-10-24 PROCEDURE — 99214 OFFICE O/P EST MOD 30 MIN: CPT | Mod: 25 | Performed by: FAMILY MEDICINE

## 2019-10-24 RX ORDER — DOXYCYCLINE HYCLATE 100 MG
100 TABLET ORAL 2 TIMES DAILY
Qty: 28 TABLET | Refills: 0 | Status: SHIPPED | OUTPATIENT
Start: 2019-10-24 | End: 2019-11-07

## 2019-10-24 RX ORDER — CEFTRIAXONE SODIUM 250 MG
250 VIAL (EA) INJECTION ONCE
Status: COMPLETED | OUTPATIENT
Start: 2019-10-24 | End: 2019-10-24

## 2019-10-24 RX ADMIN — Medication 250 MG: at 11:00

## 2019-10-24 ASSESSMENT — MIFFLIN-ST. JEOR: SCORE: 1883.49

## 2019-10-24 NOTE — NURSING NOTE
Clinic Administered Medication Documentation    MEDICATION LIST:   Injectable Medication Documentation    Patient was given Ceftriaxone Sodium (Rocephin). Prior to medication administration, verified patients identity using patient s name and date of birth. Please see MAR and medication order for additional information. Patient instructed to remain in clinic for 15 minutes and report any adverse reaction to staff immediately .      Was entire vial of medication used? Yes  Vial/Syringe: Single dose vial  Expiration Date:  12/1/2020  Was this medication supplied by the patient? No     Nancy Sam CMA

## 2019-10-24 NOTE — PROGRESS NOTES
"Subjective     Rachelle Mckeon is a 18 year old female who presents to clinic today for the following health issues:    HPI     Chief Complaint   Patient presents with     Pain     Pain in the groin area with sex and tampons for a few months, thought was related to period but contiunes after period, Also having flank pain constant, but the level pain varies.      Has flank pain she feels better supine   Has tried hot cold nsaids not much help  She has some pain bilaterally the same   \has just been going on the last few months   Did not have this when she was last in has been getting worse   No fever or chills   Uses condoms   Period ended last week and it was normal       Patient Active Problem List   Diagnosis     Health Care Home     Moderate major depression (H)     Anxiety     Mood disorder (H)     Migraine with aura and without status migrainosus, not intractable     Past Surgical History:   Procedure Laterality Date     NO HISTORY OF SURGERY         Social History     Tobacco Use     Smoking status: Passive Smoke Exposure - Never Smoker     Smokeless tobacco: Never Used     Tobacco comment: Stepmother- outside and every other weekend with dad.    Substance Use Topics     Alcohol use: No     No family history on file.          Reviewed and updated as needed this visit by Provider         Review of Systems   ROS COMP: Constitutional, HEENT, cardiovascular, pulmonary, gi and gu systems are negative, except as otherwise noted.      Objective    /80   Pulse 92   Temp 98.4  F (36.9  C) (Tympanic)   Ht 1.725 m (5' 7.91\")   Wt 106.1 kg (234 lb)   LMP 10/04/2019 (Exact Date)   SpO2 98%   BMI 35.67 kg/m    Body mass index is 35.67 kg/m .  Physical Exam   GENERAL: healthy, alert and no distress  RESP: lungs clear to auscultation - no rales, rhonchi or wheezes  CV: regular rate and rhythm, normal S1 S2, no S3 or S4, no murmur, click or rub, no peripheral edema and peripheral pulses strong  ABDOMEN: no " "organomegaly or masses, liver span normal to percussion, bowel sounds normal and + [place] right lower abdomen   MS: no gross musculoskeletal defects noted, no edema  Pelvic Exam:  Vulva: No external lesions, normal hair distribution, no adenopathy  Vagina: Moist, pink,  well rugated, no lesions tender at introitus foul discharge   Cervix: , parous, smooth, pink, no visible lesions  Uterus: Normal size, anteverted, tender  Mobile + chandelier   Ovaries: No mass,         Diagnostic Test Results:  Results for orders placed or performed in visit on 10/24/19 (from the past 24 hour(s))   Wet prep   Result Value Ref Range    Specimen Description Vagina     Wet Prep No Trichomonas seen     Wet Prep No clue cells seen     Wet Prep No yeast seen     Wet Prep Many  WBC'S seen              Assessment & Plan     1. Pelvic pain in female    - Neisseria gonorrhoeae PCR  - Chlamydia trachomatis PCR  - Wet prep    2. Pelvic inflammatory disease    - cefTRIAXone (ROCEPHIN) injection 250 mg  - doxycycline hyclate (VIBRA-TABS) 100 MG tablet; Take 1 tablet (100 mg) by mouth 2 times daily for 14 days  Dispense: 28 tablet; Refill: 0     BMI:   Estimated body mass index is 35.67 kg/m  as calculated from the following:    Height as of this encounter: 1.725 m (5' 7.91\").    Weight as of this encounter: 106.1 kg (234 lb).   Weight management plan: Discussed healthy diet and exercise guidelines        Patient Instructions   Please make sure that your partner gets tested   Take all of the antibiotic.      Patient information for PID printed and given    No follow-ups on file.    Vaishnavi Benjamin MD  [facility]      "

## 2019-12-17 DIAGNOSIS — Z30.41 ORAL CONTRACEPTIVE PILL SURVEILLANCE: ICD-10-CM

## 2019-12-17 NOTE — TELEPHONE ENCOUNTER
"MONO-LINYAH 0.25-35 MG-MCG TABS      Last Written Prescription Date:  4/16/19  Last Fill Quantity: 84,   # refills: 3  Last Office Visit: 10/24/19  Future Office visit:       Requested Prescriptions   Pending Prescriptions Disp Refills     MONO-LINYAH 0.25-35 MG-MCG tablet [Pharmacy Med Name: MONO-LINYAH 0.25-35 MG-MCG TABS] 84 tablet 3     Sig: TAKE ONE TABLET BY MOUTH EVERY DAY. MAY SKIP PLACEBO TABLETS.       Contraceptives Protocol Passed - 12/17/2019 11:48 AM        Passed - Patient is not a current smoker if age is 35 or older        Passed - Recent (12 mo) or future (30 days) visit within the authorizing provider's specialty     Patient has had an office visit with the authorizing provider or a provider within the authorizing providers department within the previous 12 mos or has a future within next 30 days. See \"Patient Info\" tab in inbasket, or \"Choose Columns\" in Meds & Orders section of the refill encounter.              Passed - Medication is active on med list        Passed - No active pregnancy on record        Passed - No positive pregnancy test in past 12 months          "

## 2019-12-18 RX ORDER — NORGESTIMATE AND ETHINYL ESTRADIOL 0.25-0.035
KIT ORAL
Qty: 84 TABLET | Refills: 2 | Status: SHIPPED | OUTPATIENT
Start: 2019-12-18 | End: 2020-08-20

## 2019-12-18 NOTE — TELEPHONE ENCOUNTER
Prescription approved per [facility] Refill Protocol.  Mark Chowdary RN

## 2020-07-21 ENCOUNTER — VIRTUAL VISIT (OUTPATIENT)
Dept: FAMILY MEDICINE | Facility: CLINIC | Age: 20
End: 2020-07-21
Payer: COMMERCIAL

## 2020-07-21 DIAGNOSIS — R30.0 DYSURIA: ICD-10-CM

## 2020-07-21 DIAGNOSIS — G47.00 PERSISTENT INSOMNIA: Primary | ICD-10-CM

## 2020-07-21 DIAGNOSIS — F32.1 MODERATE MAJOR DEPRESSION (H): ICD-10-CM

## 2020-07-21 DIAGNOSIS — N30.01 ACUTE CYSTITIS WITH HEMATURIA: ICD-10-CM

## 2020-07-21 LAB
ALBUMIN UR-MCNC: NEGATIVE MG/DL
APPEARANCE UR: CLEAR
BILIRUB UR QL STRIP: NEGATIVE
COLOR UR AUTO: YELLOW
GLUCOSE UR STRIP-MCNC: NEGATIVE MG/DL
HGB UR QL STRIP: ABNORMAL
KETONES UR STRIP-MCNC: NEGATIVE MG/DL
LEUKOCYTE ESTERASE UR QL STRIP: NEGATIVE
NITRATE UR QL: NEGATIVE
NON-SQ EPI CELLS #/AREA URNS LPF: NORMAL /LPF
PH UR STRIP: 5.5 PH (ref 5–7)
RBC #/AREA URNS AUTO: NORMAL /HPF
SOURCE: ABNORMAL
SP GR UR STRIP: 1.02 (ref 1–1.03)
SPECIMEN SOURCE: NORMAL
UROBILINOGEN UR STRIP-ACNC: 0.2 EU/DL (ref 0.2–1)
WBC #/AREA URNS AUTO: NORMAL /HPF
WET PREP SPEC: NORMAL

## 2020-07-21 PROCEDURE — 99214 OFFICE O/P EST MOD 30 MIN: CPT | Mod: 95 | Performed by: FAMILY MEDICINE

## 2020-07-21 PROCEDURE — 87210 SMEAR WET MOUNT SALINE/INK: CPT | Performed by: FAMILY MEDICINE

## 2020-07-21 PROCEDURE — 81001 URINALYSIS AUTO W/SCOPE: CPT | Performed by: FAMILY MEDICINE

## 2020-07-21 RX ORDER — VENLAFAXINE HYDROCHLORIDE 37.5 MG/1
37.5 CAPSULE, EXTENDED RELEASE ORAL DAILY
Qty: 45 CAPSULE | Refills: 1 | Status: SHIPPED | OUTPATIENT
Start: 2020-07-21 | End: 2021-02-23 | Stop reason: SINTOL

## 2020-07-21 RX ORDER — SULFAMETHOXAZOLE/TRIMETHOPRIM 800-160 MG
1 TABLET ORAL 2 TIMES DAILY
Qty: 10 TABLET | Refills: 0 | Status: SHIPPED | OUTPATIENT
Start: 2020-07-21 | End: 2020-07-26

## 2020-07-21 RX ORDER — QUETIAPINE FUMARATE 25 MG/1
25 TABLET, FILM COATED ORAL
Qty: 60 TABLET | Refills: 3 | Status: SHIPPED | OUTPATIENT
Start: 2020-07-21 | End: 2021-02-23

## 2020-07-21 ASSESSMENT — ANXIETY QUESTIONNAIRES
5. BEING SO RESTLESS THAT IT IS HARD TO SIT STILL: NEARLY EVERY DAY
2. NOT BEING ABLE TO STOP OR CONTROL WORRYING: NEARLY EVERY DAY
7. FEELING AFRAID AS IF SOMETHING AWFUL MIGHT HAPPEN: MORE THAN HALF THE DAYS
IF YOU CHECKED OFF ANY PROBLEMS ON THIS QUESTIONNAIRE, HOW DIFFICULT HAVE THESE PROBLEMS MADE IT FOR YOU TO DO YOUR WORK, TAKE CARE OF THINGS AT HOME, OR GET ALONG WITH OTHER PEOPLE: SOMEWHAT DIFFICULT
1. FEELING NERVOUS, ANXIOUS, OR ON EDGE: NEARLY EVERY DAY
GAD7 TOTAL SCORE: 17
6. BECOMING EASILY ANNOYED OR IRRITABLE: SEVERAL DAYS
3. WORRYING TOO MUCH ABOUT DIFFERENT THINGS: MORE THAN HALF THE DAYS

## 2020-07-21 ASSESSMENT — PATIENT HEALTH QUESTIONNAIRE - PHQ9
SUM OF ALL RESPONSES TO PHQ QUESTIONS 1-9: 12
5. POOR APPETITE OR OVEREATING: NEARLY EVERY DAY

## 2020-07-21 NOTE — PATIENT INSTRUCTIONS
When I get the lab results later today, I will let you know what the nxt step is.   They should be calling you in the next few days to schedule you with a therapist   Please start the venlafaxine. 1 capsule for 2 weeks then increase to 2   Stop if you are having bad side effects   I would like to see you in 2 weeks or so. I am seeing patients at the [facility] start 7/27    You can also go

## 2020-07-21 NOTE — PROGRESS NOTES
"Rachelle Mckeon is a 19 year old female who is being evaluated via a billable telephone visit.      The patient has been notified of following:     \"This telephone visit will be conducted via a call between you and your physician/provider. We have found that certain health care needs can be provided without the need for a physical exam.  This service lets us provide the care you need with a short phone conversation.  If a prescription is necessary we can send it directly to your pharmacy.  If lab work is needed we can place an order for that and you can then stop by our lab to have the test done at a later time.    Telephone visits are billed at different rates depending on your insurance coverage. During this emergency period, for some insurers they may be billed the same as an in-person visit.  Please reach out to your insurance provider with any questions.    If during the course of the call the physician/provider feels a telephone visit is not appropriate, you will not be charged for this service.\"    Patient has given verbal consent for Telephone visit?  Yes    What phone number would you like to be contacted at? [phone]    How would you like to obtain your AVS? Mail a copy    Subjective     Rachelle Mckeon is a 19 year old female who presents via phone visit today for the following health issues:    HPI    URINARY TRACT SYMPTOMS      Duration: past 5-6 days     Description  frequency, urgency and back pain    Intensity:  moderate    Accompanying signs and symptoms:  Fever/chills: YES- chills   Flank pain no - lower back pain   Nausea and vomiting: YES- slight nausea at night   Vaginal symptoms:slight itching  Abdominal/Pelvic Pain: YES- on and off abdominal pain     History  History of frequent UTI's: no   History of kidney stones: no   Sexually Active: no   Possibility of pregnancy: No    Precipitating or alleviating factors: None    Therapies tried and outcome: cranberry juice         As above she has "   Updated PHQ-9 and JOSEFINA    PHQ-9 ([employer]) 7/21/2020   No Interest In Doing Things    Feeling Depressed    Trouble Sleeping    Tired / No Energy    No appetite or Over-Eating    Feeling Bad about Self    Trouble Concentrating    Moving Slow or Restless    Suicidal Thoughts    Total Score    1.  Little interest or pleasure in doing things 1   2.  Feeling down, depressed, or hopeless 1   3.  Trouble falling or staying asleep, or sleeping too much 3   4.  Feeling tired or having little energy 1   5.  Poor appetite or overeating 0   6.  Feeling bad about yourself 1   7.  Trouble concentrating 2   8.  Moving slowly or restless 2   9.  Suicidal or self-harm thoughts 1   PHQ-9 Total Score 12   Difficulty at work, home, or with people Somewhat difficult   JOSEFINA-7   [employer], 2002; Used with Permission) 7/21/2020   Over the last 2 weeks, how often have you been bothered by feeling nervous, anxious or on edge?    Over the last 2 weeks, how often have you been bothered by not being able to stop or control worrying?    Over the last 2 weeks, how often have you been bothered by worrying too much about different things?    Over the last 2 weeks, how often have you been bothered by trouble relaxing?    Over the last 2 weeks, how often have you been bothered by being so restless that it is hard to sit still?    Over the last 2 weeks, how often have you been bothered by becoming easily annoyed or irritable?    Over the last 2 weeks, how often have you been bothered by feeling afraid as if something awful might happen?    JOSEFINA-7 Total Score =     1. Feeling nervous, anxious, or on edge 3   2. Not being able to stop or control worrying 3   3. Worrying too much about different things 2   4. Trouble relaxing 3   5. Being so restless that it is hard to sit still 3   6. Becoming easily annoyed or irritable 1   7. Feeling afraid, as if something awful might happen 2   JOSEFINA-7 Total Score 17   If you checked any problems, how difficult have  they made it for you to do your work, take care of things at home, or get along with other people? Somewhat difficult     She is not doing well with her mood   She has taken prozac made her worse  And she also has some anxiety   She had a friend that recently    She is not sleeping well   Had used seroquel a few years ago and it did work   She has not seen a therapist   Her friend  of a heart attack very unexpected      She is tearful  Not suicidal just doesn't want to deal with things   She has no plan to harm herself,     When she took lexapro it helped in the beginning then not so much and she felt better off of it.   Her anxiety is pretty high right now   We discussed seeing a therapist and she is open to this.         Patient Active Problem List   Diagnosis     Health Care Home     Moderate major depression (H)     Anxiety     Mood disorder (H)     Migraine with aura and without status migrainosus, not intractable     Past Surgical History:   Procedure Laterality Date     NO HISTORY OF SURGERY         Social History     Tobacco Use     Smoking status: Passive Smoke Exposure - Never Smoker     Smokeless tobacco: Never Used     Tobacco comment: Stepmother- outside and every other weekend with dad.    Substance Use Topics     Alcohol use: No     History reviewed. No pertinent family history.      Allergies   Allergen Reactions     Mold      Wool Fiber Hives       Reviewed and updated as needed this visit by Provider         Review of Systems   Constitutional, HEENT, cardiovascular, pulmonary, gi and gu systems are negative, except as otherwise noted.       Objective   Reported vitals:  There were no vitals taken for this visit.   alert, no distress and crying  PSYCH: Alert and oriented times 3; coherent speech, normal   rate and volume, able to articulate logical thoughts, able   to abstract reason, no tangential thoughts, no hallucinations   or delusions  Her affect is tearful and sad  MENTAL STATUS EXAM:        2. She appeared to be well-oriented in all spheres with coherent, logical, goal directed and relevent thinking.   3. Thought content: She denies no abnormal thought process.   4. Affect and mood: April's affect is described as sad/depressed and tearful and her emotional attitude was open and cooperative. She reports the following sypmtoms: sad feeling or depressed, difficulty sleeping, difficulty concentrating, drawing away from people, lack of interest or enjoyment, less energy than usual, thoughts of death, too much worry, nervous or tense feeling and dwelling on problems.    5. Sensorium and cognition: She was in contact with reality and oriented to time, place and person.  She demonstrated no impairment in immediate, recent, or remote memory. Her insight was adequate and her  intelligence appeared to be average.      RESP: No cough, no audible wheezing, able to talk in full sentences  Remainder of exam unable to be completed due to telephone visits    Diagnostic Test Results:  Labs reviewed in Epic        Assessment/Plan:    1. Persistent insomnia  Will restart the seroquel and also refer for therapy   - QUEtiapine (SEROQUEL) 25 MG tablet; Take 1 tablet (25 mg) by mouth nightly as needed (for insomnia)  Dispense: 60 tablet; Refill: 3  - MENTAL HEALTH REFERRAL  - Adult; Outpatient Treatment; Individual/Couples/Family/Group Therapy/Health Psychology; [facility]: [facility] [phone]; We will contact you to schedule the appointment or please call with any questions    2. Moderate major depression (H)  Start effexor she анна stop if she is feeling worse on this   - venlafaxine (EFFEXOR-XR) 37.5 MG 24 hr capsule; Take 1 capsule (37.5 mg) by mouth daily For 2 weeks then increase to 2 caps  Dispense: 45 capsule; Refill: 1  - MENTAL HEALTH REFERRAL  - Adult; Outpatient Treatment; Individual/Couples/Family/Group Therapy/Health Psychology; [facility]: [facility] [phone]; We will  contact you to schedule the appointment or please call with any questions    3. Dysuria  Has lab appointment later today   - *UA reflex to Microscopic and Culture ([place] [facility] (except [place] and [place]); Future  - Wet prep; Future    Return in about 2 weeks (around 8/4/2020).      Phone call duration: 13 minutes    Vaishnavi Benjamin MD

## 2020-07-22 ASSESSMENT — ANXIETY QUESTIONNAIRES: GAD7 TOTAL SCORE: 17

## 2020-08-10 ENCOUNTER — VIRTUAL VISIT (OUTPATIENT)
Dept: BEHAVIORAL HEALTH | Facility: CLINIC | Age: 20
End: 2020-08-10
Payer: COMMERCIAL

## 2020-08-10 DIAGNOSIS — F41.1 GAD (GENERALIZED ANXIETY DISORDER): ICD-10-CM

## 2020-08-10 DIAGNOSIS — F32.1 MODERATE MAJOR DEPRESSION (H): Primary | ICD-10-CM

## 2020-08-10 PROCEDURE — 90834 PSYTX W PT 45 MINUTES: CPT | Mod: 95 | Performed by: SOCIAL WORKER

## 2020-08-10 ASSESSMENT — ANXIETY QUESTIONNAIRES
GAD7 TOTAL SCORE: 17
IF YOU CHECKED OFF ANY PROBLEMS ON THIS QUESTIONNAIRE, HOW DIFFICULT HAVE THESE PROBLEMS MADE IT FOR YOU TO DO YOUR WORK, TAKE CARE OF THINGS AT HOME, OR GET ALONG WITH OTHER PEOPLE: VERY DIFFICULT
2. NOT BEING ABLE TO STOP OR CONTROL WORRYING: NEARLY EVERY DAY
6. BECOMING EASILY ANNOYED OR IRRITABLE: MORE THAN HALF THE DAYS
7. FEELING AFRAID AS IF SOMETHING AWFUL MIGHT HAPPEN: NEARLY EVERY DAY
3. WORRYING TOO MUCH ABOUT DIFFERENT THINGS: NEARLY EVERY DAY
5. BEING SO RESTLESS THAT IT IS HARD TO SIT STILL: MORE THAN HALF THE DAYS
1. FEELING NERVOUS, ANXIOUS, OR ON EDGE: SEVERAL DAYS

## 2020-08-10 ASSESSMENT — PATIENT HEALTH QUESTIONNAIRE - PHQ9
5. POOR APPETITE OR OVEREATING: NEARLY EVERY DAY
SUM OF ALL RESPONSES TO PHQ QUESTIONS 1-9: 13

## 2020-08-10 NOTE — PROGRESS NOTES
"Rachelle Mckeon is a 19 year old female who is being evaluated via a telephone visit.      The patient has been notified of the following:     \"We have found that certain health care needs can be provided without the need for a face to face visit.  This service lets us provide the care you need with a short phone conversation.      I will have full access to your [facility] medical record during this entire phone call.   I will be taking notes for your medical record.     Since this is like an office visit, we will bill your insurance company for this service.  Please check with your medical insurance if this type of telephone visit/virtual care is covered.  You may be responsible for the cost of this service if insurance coverage is denied.      There are potential benefits and risks of telephone visits (e.g. limits to patient confidentiality) that differ from in-person visits.?  Confidentiality still applies for telephone services, and nobody will record the visit.  It is important to be in a quiet, private space that is free of distractions (including cell phone or other devices) during the visit.??     If during the course of the call I believe a telephone visit is not appropriate, you will not be charged for this service\"    Consent has been obtained for this service by care team member: yes.    Start time: 305 pm     End time: 355 pm      August 10, 2020      Behavioral Health Clinician Progress Note    Patient Name: Rachelle Mckeon           Service Type: Phone Visit      Service Location:  in clinic        Session Length: 38 - 52      Attendees: Patient    Visit Activities (Refresh list every visit): NEW and [facility] Only    Diagnostic Assessment Date: not completed  Treatment Plan Review Date: Not completed  See Flowsheets for today's PHQ-9 and JOSEFINA-7 results  Previous PHQ-9:   PHQ-9 SCORE 7/31/2019 7/21/2020 8/10/2020   PHQ-9 Total Score - - -   PHQ-9 Total Score 18 12 13     Previous JOSEFINA-7:   JOSEFINA-7 SCORE 7/31/2019 " 7/21/2020 8/10/2020   Total Score - - -   Total Score 18 17 17       ECTOR LEVEL:  No flowsheet data found.    DATA  Extended Session (60+ minutes): No  Interactive Complexity: No  Crisis: No    Treatment Objective(s) Addressed in This Session:  Target Behavior(s): disease management/lifestyle changes Decrease depression and anxiety    Depressed Mood: Increase interest, engagement, and pleasure in doing things  Decrease frequency and intensity of feeling down, depressed, hopeless  Improve quantity and quality of night time sleep / decrease daytime naps  Feel less tired and more energy during the day   Identify negative self-talk and behaviors: challenge core beliefs, myths, and actions  Improve concentration, focus, and mindfulness in daily activities   Anxiety: will experience a reduction in anxiety, will develop more effective coping skills to manage anxiety symptoms, will develop healthy cognitive patterns and beliefs and will increase ability to function adaptively  PTSD Symptom Management    Current Stressors / Issues:  First session with patient.  She was referred by primary care provider.  Patient reports being diagnosed with depression at age 11 she reports symptoms have increased since March 2020.  She also reports the ending  of a 5-year relationship in May 2020.  Patient has a history of 2 suicide attempts -age 12 and age 17.  Patient reports occasional thoughts at this time with no plan.  She reports she will tell her mother if this changes and will go to the emergency department as needed.  Patient lives with her mother and younger sister at this time.  Did not complete DA due to time constraints.      Progress on Treatment Objective(s) / Homework:  None established    Motivational Interviewing    MI Intervention: Expressed Empathy/Understanding, Supported Autonomy, Collaboration, Evocation, Permission to raise concern or advise, Open-ended questions, Reflections: simple and complex, Change talk (evoked)  and Reframe     Change Talk Expressed by the Patient: Desire to change Ability to change Reasons to change Need to change Committment to change    Provider Response to Change Talk: E - Evoked more info from patient about behavior change, A - Affirmed patient's thoughts, decisions, or attempts at behavior change, R - Reflected patient's change talk and S - Summarized patient's change talk statements      Care Plan review completed: No    Medication Review:  No changes to current psychiatric medication(s)    Medication Compliance:  Yes    Changes in Health Issues:   None reported    Chemical Use Review:   Substance Use: Chemical use reviewed, no active concerns identified      Tobacco Use: No current tobacco use.      Assessment: Current Emotional / Mental Status (status of significant symptoms):  Risk status (Self / Other harm or suicidal ideation)  Patient has had a history of suicidal ideation: On and off since age 11, suicide attempts: Age 12 and 17 and self-injurious behavior: Cutting ages [age] -none since that time  Patient denies current fears or concerns for personal safety.  Patient reports the following current or recent suicidal ideation or behaviors: Occasional thoughts no plan at this time -she will tell her mother if thoughts become unmanageable and go to the emergency department as needed.  Patient denies current or recent homicidal ideation or behaviors.  Patient denies current or recent self injurious behavior or ideation.  Patient denies other safety concerns.  A safety and risk management plan has not been developed at this time, however patient was encouraged to call [facility] should there be a change in any of these risk factors.    Appearance:   Phone visit   Eye Contact:   Phone visit   Psychomotor Behavior: Phone visit   Attitude:   Cooperative  Attentive  Orientation:   All  Speech   Rate / Production: Normal    Volume:  Soft   Mood:    Anxious  Normal Sad   Affect:    Phone visit    Thought Content:  Clear   Thought Form:  Coherent  Logical   Insight:    Good     Diagnoses:  1. Moderate major depression (H)    2. JOSEFINA (generalized anxiety disorder)        Collateral Reports Completed:  Communicated with: Primary care provider as needed    Plan: (Homework, other):  Patient was given information about behavioral services and encouraged to schedule a follow up appointment with the clinic [facility] in 2 weeks.  She was also given information about mental health symptoms and treatment options .  CD Recommendations: No indications of CD issues.   PETE Stevens (Mindy ),[facility]

## 2020-08-11 ASSESSMENT — ANXIETY QUESTIONNAIRES: GAD7 TOTAL SCORE: 17

## 2020-08-19 DIAGNOSIS — Z30.41 ORAL CONTRACEPTIVE PILL SURVEILLANCE: ICD-10-CM

## 2020-08-20 RX ORDER — NORGESTIMATE AND ETHINYL ESTRADIOL 0.25-0.035
KIT ORAL
Qty: 84 TABLET | Refills: 1 | Status: SHIPPED | OUTPATIENT
Start: 2020-08-20 | End: 2021-02-23 | Stop reason: SINTOL

## 2020-08-22 ENCOUNTER — HOSPITAL ENCOUNTER (EMERGENCY)
Facility: CLINIC | Age: 20
Discharge: HOME | End: 2020-08-22
Attending: EMERGENCY MEDICINE | Admitting: EMERGENCY MEDICINE
Payer: COMMERCIAL

## 2020-08-22 VITALS
OXYGEN SATURATION: 95 % | DIASTOLIC BLOOD PRESSURE: 99 MMHG | SYSTOLIC BLOOD PRESSURE: 128 MMHG | HEART RATE: 111 BPM | RESPIRATION RATE: 20 BRPM | TEMPERATURE: 98.9 F

## 2020-08-22 DIAGNOSIS — J02.9 PHARYNGITIS, UNSPECIFIED ETIOLOGY: ICD-10-CM

## 2020-08-22 DIAGNOSIS — H66.90 ACUTE OTITIS MEDIA, UNSPECIFIED OTITIS MEDIA TYPE: ICD-10-CM

## 2020-08-22 LAB
SPECIMEN SOURCE: NORMAL
STREP GROUP A PCR: NOT DETECTED

## 2020-08-22 PROCEDURE — 99283 EMERGENCY DEPT VISIT LOW MDM: CPT | Performed by: EMERGENCY MEDICINE

## 2020-08-22 PROCEDURE — 99284 EMERGENCY DEPT VISIT MOD MDM: CPT | Mod: Z6 | Performed by: EMERGENCY MEDICINE

## 2020-08-22 PROCEDURE — 87651 STREP A DNA AMP PROBE: CPT | Performed by: EMERGENCY MEDICINE

## 2020-08-22 PROCEDURE — U0003 INFECTIOUS AGENT DETECTION BY NUCLEIC ACID (DNA OR RNA); SEVERE ACUTE RESPIRATORY SYNDROME CORONAVIRUS 2 (SARS-COV-2) (CORONAVIRUS DISEASE [COVID-19]), AMPLIFIED PROBE TECHNIQUE, MAKING USE OF HIGH THROUGHPUT TECHNOLOGIES AS DESCRIBED BY CMS-2020-01-R: HCPCS | Performed by: EMERGENCY MEDICINE

## 2020-08-22 RX ORDER — AMOXICILLIN 875 MG
875 TABLET ORAL 2 TIMES DAILY
Qty: 20 TABLET | Refills: 0 | Status: SHIPPED | OUTPATIENT
Start: 2020-08-22 | End: 2020-09-01

## 2020-08-22 NOTE — ED PROVIDER NOTES
History     Chief Complaint   Patient presents with     Pharyngitis     Pt reports sore throat started yesterday, pt reports temp of 100.6 this AM     HPI  April Linda is a 19 year old female who presents with sore throat that began yesterday, temp 100.6 this morning, right ear pain, denies cough denies exposure to known COVID-19 patient although works at a gas station at the checkout.  Reports good compliance with mask wearing, social distancing, screen between she and her customers, wears gloves wipes down her environment.  Denies vomiting diarrhea or abdominal pain.  Non-smoker no alcohol no illicit drug use.    Allergies:  Allergies   Allergen Reactions     Mold      Wool Fiber Hives       Problem List:    Patient Active Problem List    Diagnosis Date Noted     Migraine with aura and without status migrainosus, not intractable 12/06/2017     Priority: Medium     Mood disorder (H) 12/07/2015     Priority: Medium     Health Care Home 02/28/2014     Priority: Medium     EMERGENCY CARE PLAN  February 28, 2014: No current Care Coordination follow up planned. Please refer if Care Coordination services are needed.    Presenting Problem Signs and Symptoms Treatment Plan   Questions or concerns   during clinic hours   I will call my clinic directly:  [address] [address]  [phone].    Questions or concerns outside clinic hours   I will call the 24 hour nurse line at   [phone] or [address] [place] [address].   Need to schedule an appointment   I will call the 24 hour scheduling team at [phone] or my clinic directly at [phone].    Same day treatment     I will call my clinic first, nurse line if after hours, urgent care and express care if needed.   Clinic care coordination services (regular clinic hours)     I will call a clinic care coordinator directly:     Mateo Hooks RN  Mon, Tues, Fri - [phone]  Wed, Thurs - [phone]    MERY Fofana:    [phone]    Or  call my clinic at [phone] and ask to speak with care coordination.   Crisis Services: Behavioral or Mental Health  [facility] 24 Hour Phone Line  [phone]    [facility] 24 Hour Crisis Services  [phone]    [facility] [phone]    [facility]   [phone]       Emergency treatment -- Immediately    [facility] 911          Moderate major depression (H) 02/28/2014     Priority: Medium     Anxiety 02/28/2014     Priority: Medium        Past Medical History:    Past Medical History:   Diagnosis Date     NO ACTIVE PROBLEMS        Past Surgical History:    Past Surgical History:   Procedure Laterality Date     NO HISTORY OF SURGERY         Family History:    No family history on file.    Social History:  Marital Status:  Single [1]  Social History     Tobacco Use     Smoking status: Passive Smoke Exposure - Never Smoker     Smokeless tobacco: Never Used     Tobacco comment: Stepmother- outside and every other weekend with dad.    Substance Use Topics     Alcohol use: No     Drug use: No        Medications:    amoxicillin (AMOXIL) 875 MG tablet  albuterol (PROAIR HFA/PROVENTIL HFA/VENTOLIN HFA) 108 (90 Base) MCG/ACT inhaler  Biotin 34551 MCG TABS  cetirizine (ZYRTEC) 10 MG tablet  Cholecalciferol (VITAMIN D PO)  fluticasone (FLONASE) 50 MCG/ACT nasal spray  MONO-LINYAH 0.25-35 MG-MCG tablet  QUEtiapine (SEROQUEL) 25 MG tablet  SUMAtriptan (IMITREX) 25 MG tablet  venlafaxine (EFFEXOR-XR) 37.5 MG 24 hr capsule          Review of Systems  All other systems reviewed and are negative.    Physical Exam   BP: (!) 128/99  Pulse: 111  Temp: 98.9  F (37.2  C)  Resp: 20  SpO2: 95 %      Physical Exam  Nontoxic appearing no respiratory distress alert and oriented ×3  Head atraumatic normocephalic  TMs/EACs unremarkable with exception of dull red right TM, conjunctiva noninjected, oropharynx moist without lesions or erythema  No minimal anterior cervical adenopathy  is mildly tender  Neck supple full active painless range of motion  Lungs clear to auscultation  Heart regular no murmur  Strength and sensation grossly intact throughout the extremities, gait and station normal  Speech is fluent, good eye contact, thought processes are rational      ED Course        Procedures               Critical Care time:  none               Results for orders placed or performed during the hospital encounter of 08/22/20 (from the past 24 hour(s))   Group A Streptococcus PCR Throat Swab    Specimen: Throat   Result Value Ref Range    Specimen Description Throat     Strep Group A PCR Not Detected NDET^Not Detected       Medications - No data to display    Assessments & Plan (with Medical Decision Making)  Sore throat right ear pain moderate risk for COVID-19.  COVID swab pending, rapid strep negative, treat with amoxicillin given right otitis.  Quarantine, wear a mask, social distancing await COVID-19 result.  Return criteria reviewed     I have reviewed the nursing notes.    I have reviewed the findings, diagnosis, plan and need for follow up with the patient.            Discharge Medication List as of 8/22/2020 12:25 PM      START taking these medications    Details   amoxicillin (AMOXIL) 875 MG tablet Take 1 tablet (875 mg) by mouth 2 times daily for 10 days, Disp-20 tablet,R-0, E-Prescribe             Final diagnoses:   Acute otitis media, unspecified otitis media type   Pharyngitis, unspecified etiology       8/22/2020   [facility] EMERGENCY DEPARTMENT     Canelo Kerr MD  08/23/20 1629

## 2020-08-22 NOTE — DISCHARGE INSTRUCTIONS
"Amoxicillin, Tylenol ibuprofen, plenty fluids and rest    Await COVID testing results, quarantine until negative.    Return here for worsening symptoms    Discharge Instructions for COVID-19 Patients  You have--or may have--COVID-19. Please follow the instructions listed below.   If you have a weakened immune system, discuss with your doctor any other actions you need to take.  How can I protect others?  If you have symptoms (fever, cough, body aches or trouble breathing):  Stay home and away from others (self-isolate) until:  At least 10 days have passed since your symptoms started. And   You've had no fever--and no medicine that reduces fever--for 1 full day (24 hours). And   Your other symptoms have resolved (gotten better).  If you don't show symptoms, but testing showed that you have COVID-19:  Stay home and away from others (self-isolate) until at least 10 days have passed since the date of your first positive COVID-19 test.  During this time  Stay in your own room, even for meals. Use your own bathroom if you can.  Stay away from others in your home. No hugging, kissing or shaking hands. No visitors.  Don't go to work, school or anywhere else.  Clean \"high touch\" surfaces often (doorknobs, counters, handles). Use household cleaning spray or wipes. You'll find a full list of  on the EPA website: www.epa.gov/pesticide-registration/list-n-disinfectants-use-against-sars-cov-2.  Cover your mouth and nose with a mask or other face covering to avoid spreading germs.  Wash your hands and face often. Use soap and water.  Caregivers in these groups are at risk for severe illness due to COVID-19:  People 65 years and older  People who live in a nursing home or long-term care facility  People with chronic disease (lung, heart, cancer, diabetes, kidney, liver, immunologic)  People who have a weakened immune system, including those who:  Are in cancer treatment  Take medicine that weakens the immune system, such as " corticosteroids  Had a bone marrow or organ transplant  Have an immune deficiency  Have poorly controlled HIV or AIDS  Are obese (body mass index of 40 or higher)  Smoke regularly  Caregivers should wear gloves while washing dishes, handling laundry and cleaning bedrooms and bathrooms.  Use caution when washing and drying laundry: Don't shake dirty laundry and use the warmest water setting that you can.  For more tips on managing your health at home, go to www.cdc.gov/coronavirus/2019-ncov/downloads/10Things.pdf.  How can I take care of myself at home?  Get lots of rest. Drink extra fluids (unless a doctor has told you not to).  Take Tylenol (acetaminophen) for fever or pain. If you have liver or kidney problems, ask your family doctor if it's okay to take Tylenol.     Adults can take either:  650 mg (two 325 mg pills) every 4 to 6 hours, or   1,000 mg (two 500 mg pills) every 8 hours as needed.  Note: Don't take more than 3,000 mg in one day. Acetaminophen is found in many medicines (both prescribed and over-the-counter medicines). Read all labels to be sure you don't take too much.   For children, check the Tylenol bottle for the right dose. The dose is based on the child's age or weight.  If you have other health problems (like cancer, heart failure, an organ transplant or severe kidney disease): Call your specialty clinic if you don't feel better in the next 2 days.  Know when to call 911. Emergency warning signs include:  Trouble breathing or shortness of breath  Pain or pressure in the chest that doesn't go away  Feeling confused like you haven't felt before, or not being able to wake up  Bluish-colored lips or face  Your doctor may have prescribed a blood thinner medicine. Follow their instructions.  Where can I get more information?   [employer] [place] - About COVID-19: [email or web address]  CDC - What to Do If You're Sick: www.cdc.gov/coronavirus/2019-ncov/about/steps-when-sick.html  CDC - Ending Home  Isolation: www.cdc.gov/coronavirus/2019-ncov/hcp/disposition-in-home-patients.html  CDC - Caring for Someone: www.cdc.gov/coronavirus/2019-ncov/if-you-are-sick/care-for-someone.html  [facility] - Interim Guidance for Hospital Discharge to Home: www.[facility].[place]/diseases/coronavirus/hcp/hospdischarge.pdf  [facility] clinical trials (COVID-19 research studies): clinicalaffairs.[facility]-clinical-trials  Below are the COVID-19 hotlines at the [place] Department [facility] ([facility]). Interpreters are available.  For health questions: Call 217-463-3647 or 1-535.485.2257 (7 a.m. to 7 p.m.)  For questions about schools and childcare: Call 548-175-3921 or 1-255.243.5991 (7 a.m. to 7 p.m.)    For informational purposes only. Not to replace the advice of your health care provider. Clinically reviewed by the Infection Prevention Team. Copyright   2020 [facility]. All rights reserved. [employer] 306675 - REV 08/04/20.

## 2020-08-22 NOTE — ED AVS SNAPSHOT
[facility]  [address] [address]  Phone:  [phone]  Fax:  [phone]                                    April Linda   MRN: [id number]    Department:  [facility]   Date of Visit:  8/22/2020           After Visit Summary Signature Page    I have received my discharge instructions, and my questions have been answered. I have discussed any challenges I see with this plan with the nurse or doctor.    ..........................................................................................................................................  Patient/Patient Representative Signature      ..........................................................................................................................................  Patient Representative Print Name and Relationship to Patient    ..................................................               ................................................  Date                                   Time    ..........................................................................................................................................  Reviewed by Signature/Title    ...................................................              ..............................................  Date                                               Time          22EPIC Rev 08/18

## 2020-08-23 ASSESSMENT — COLUMBIA-SUICIDE SEVERITY RATING SCALE - C-SSRS
FIRST ATTEMPT DATE: 62943
REASONS FOR IDEATION LIFETIME: COMPLETELY TO END OR STOP THE PAIN (YOU COULDN'T GO ON LIVING WITH THE PAIN OR HOW YOU WERE FEELING)
TOTAL  NUMBER OF ABORTED OR SELF INTERRUPTED ATTEMPTS PAST LIFETIME: NO
REASONS FOR IDEATION PAST MONTH: COMPLETELY TO END OR STOP THE PAIN (YOU COULDN'T GO ON LIVING WITH THE PAIN OR HOW YOU WERE FEELING)
1. IN THE PAST MONTH, HAVE YOU WISHED YOU WERE DEAD OR WISHED YOU COULD GO TO SLEEP AND NOT WAKE UP?: YES
6. HAVE YOU EVER DONE ANYTHING, STARTED TO DO ANYTHING, OR PREPARED TO DO ANYTHING TO END YOUR LIFE?: NO
6. HAVE YOU EVER DONE ANYTHING, STARTED TO DO ANYTHING, OR PREPARED TO DO ANYTHING TO END YOUR LIFE?: NO
ATTEMPT PAST THREE MONTHS: NO
LETHALITY/MEDICAL DAMAGE CODE MOST LETHAL ACTUAL ATTEMPT: MODERATE PHYSICAL DAMAGE, MEDICAL ATTENTION NEEDED
1. IN THE PAST MONTH, HAVE YOU WISHED YOU WERE DEAD OR WISHED YOU COULD GO TO SLEEP AND NOT WAKE UP?: YES
4. HAVE YOU HAD THESE THOUGHTS AND HAD SOME INTENTION OF ACTING ON THEM?: YES
TOTAL  NUMBER OF INTERRUPTED ATTEMPTS PAST 3 MONTHS: 2
2. HAVE YOU ACTUALLY HAD ANY THOUGHTS OF KILLING YOURSELF?: NO
MOST RECENT DATE: 64861
TOTAL  NUMBER OF ABORTED OR SELF INTERRUPTED ATTEMPTS PAST 3 MONTHS: NO
TOTAL  NUMBER OF INTERRUPTED ATTEMPTS LIFETIME: YES
4. HAVE YOU HAD THESE THOUGHTS AND HAD SOME INTENTION OF ACTING ON THEM?: NO
3. HAVE YOU BEEN THINKING ABOUT HOW YOU MIGHT KILL YOURSELF?: YES
LETHALITY/MEDICAL DAMAGE CODE MOST RECENT ACTUAL ATTEMPT: MODERATE PHYSICAL DAMAGE, MEDICAL ATTENTION NEEDED
LETHALITY/MEDICAL DAMAGE CODE FIRST ACTUAL ATTEMPT: MODERATE PHYSICAL DAMAGE, MEDICAL ATTENTION NEEDED
ATTEMPT LIFETIME: YES
5. HAVE YOU STARTED TO WORK OUT OR WORKED OUT THE DETAILS OF HOW TO KILL YOURSELF? DO YOU INTEND TO CARRY OUT THIS PLAN?: NO
5. HAVE YOU STARTED TO WORK OUT OR WORKED OUT THE DETAILS OF HOW TO KILL YOURSELF? DO YOU INTEND TO CARRY OUT THIS PLAN?: YES
TOTAL  NUMBER OF ACTUAL ATTEMPTS LIFETIME: 2
TOTAL  NUMBER OF INTERRUPTED ATTEMPTS PAST 3 MONTHS: NO
2. HAVE YOU ACTUALLY HAD ANY THOUGHTS OF KILLING YOURSELF LIFETIME?: YES

## 2020-08-24 LAB
SARS-COV-2 RNA SPEC QL NAA+PROBE: NOT DETECTED
SPECIMEN SOURCE: NORMAL

## 2020-08-26 ENCOUNTER — VIRTUAL VISIT (OUTPATIENT)
Dept: BEHAVIORAL HEALTH | Facility: CLINIC | Age: 20
End: 2020-08-26
Payer: COMMERCIAL

## 2020-08-26 DIAGNOSIS — F32.1 MODERATE MAJOR DEPRESSION (H): ICD-10-CM

## 2020-08-26 DIAGNOSIS — F41.1 GAD (GENERALIZED ANXIETY DISORDER): Primary | ICD-10-CM

## 2020-08-26 PROCEDURE — 90791 PSYCH DIAGNOSTIC EVALUATION: CPT | Mod: 95 | Performed by: SOCIAL WORKER
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
## 2020-09-14 ENCOUNTER — VIRTUAL VISIT (OUTPATIENT)
Dept: BEHAVIORAL HEALTH | Facility: CLINIC | Age: 20
End: 2020-09-14
Payer: COMMERCIAL

## 2020-09-14 DIAGNOSIS — F32.1 MODERATE MAJOR DEPRESSION (H): ICD-10-CM

## 2020-09-14 DIAGNOSIS — F41.1 GAD (GENERALIZED ANXIETY DISORDER): Primary | ICD-10-CM

## 2020-09-14 PROCEDURE — 90834 PSYTX W PT 45 MINUTES: CPT | Mod: 95 | Performed by: SOCIAL WORKER

## 2020-09-14 NOTE — PROGRESS NOTES
"Rachelle Mckeon is a 19 year old female who is being evaluated via a telephone visit.      The patient has been notified of the following:     \"We have found that certain health care needs can be provided without the need for a face to face visit.  This service lets us provide the care you need with a short phone conversation.      I will have full access to your [place] medical record during this entire phone call.   I will be taking notes for your medical record.     Since this is like an office visit, we will bill your insurance company for this service.  Please check with your medical insurance if this type of telephone visit/virtual care is covered.  You may be responsible for the cost of this service if insurance coverage is denied.      There are potential benefits and risks of telephone visits (e.g. limits to patient confidentiality) that differ from in-person visits.?  Confidentiality still applies for telephone services, and nobody will record the visit.  It is important to be in a quiet, private space that is free of distractions (including cell phone or other devices) during the visit.??     If during the course of the call I believe a telephone visit is not appropriate, you will not be charged for this service\"    Consent has been obtained for this service by care team member: yes.    Start time: 305 pm     End time: 355 pm      September 14, 2020      Behavioral Health Clinician Progress Note    Patient Name: Rachelle Mckeon           Service Type: Phone Visit      Service Location:  at the patient's home        Session Length: 38 - 52      Attendees: Patient    Visit Activities (Refresh list every visit): [facility] Only    Diagnostic Assessment Date: 8/26/2020  Treatment Plan Review Date: Not completed  See Flowsheets for today's PHQ-9 and JOSEFINA-7 results  Previous PHQ-9:   PHQ-9 SCORE 7/31/2019 7/21/2020 8/10/2020   PHQ-9 Total Score - - -   PHQ-9 Total Score 18 12 13     Previous JOSEFINA-7:   JOSEFINA-7 SCORE 7/31/2019 " 7/21/2020 8/10/2020   Total Score - - -   Total Score 18 17 17       ECTOR LEVEL:  No flowsheet data found.    DATA  Extended Session (60+ minutes): No  Interactive Complexity: No  Crisis: No    Treatment Objective(s) Addressed in This Session:  Target Behavior(s): disease management/lifestyle changes Decrease depression and anxiety    Depressed Mood: Increase interest, engagement, and pleasure in doing things  Decrease frequency and intensity of feeling down, depressed, hopeless  Improve quantity and quality of night time sleep / decrease daytime naps  Feel less tired and more energy during the day   Identify negative self-talk and behaviors: challenge core beliefs, myths, and actions  Improve concentration, focus, and mindfulness in daily activities   Anxiety: will experience a reduction in anxiety, will develop more effective coping skills to manage anxiety symptoms, will develop healthy cognitive patterns and beliefs and will increase ability to function adaptively  PTSD Symptom Management    Current Stressors / Issues:  Patient reports symptoms continue although have decreased in intensity. Patient failed 's test - will retake next month.   Patient reports occasional thoughts at this time with no plan - last time was right after 's test. She reports thoughts did not last long. She reports she will tell her mother if this changes and will go to the emergency department as needed. Discussed trauma and how it affects daily life. Patient will continue to monitor this. Discussed breathing exercises in session to practice. Patient will return in two weeks.      Progress on Treatment Objective(s) / Homework:  New Objective established this session - PREPARATION (Decided to change - considering how); Intervened by negotiating a change plan and determining options / strategies for behavior change, identifying triggers, exploring social supports, and working towards setting a date to begin behavior  change    Motivational Interviewing    MI Intervention: Co-Developed Goal: decrease symptoms that are due to past trauma, Expressed Empathy/Understanding, Supported Autonomy, Collaboration, Evocation, Permission to raise concern or advise, Open-ended questions, Reflections: simple and complex, Change talk (evoked) and Reframe     Change Talk Expressed by the Patient: Desire to change Ability to change Reasons to change Need to change Committment to change    Provider Response to Change Talk: E - Evoked more info from patient about behavior change, A - Affirmed patient's thoughts, decisions, or attempts at behavior change, R - Reflected patient's change talk and S - Summarized patient's change talk statements      Care Plan review completed: No    Medication Review:  No changes to current psychiatric medication(s)    Medication Compliance:  Yes    Changes in Health Issues:   None reported    Chemical Use Review:   Substance Use: Chemical use reviewed, no active concerns identified      Tobacco Use: No current tobacco use.      Assessment: Current Emotional / Mental Status (status of significant symptoms):  Risk status (Self / Other harm or suicidal ideation)  Patient has had a history of suicidal ideation: On and off since age 11, suicide attempts: Age 12 and 17 and self-injurious behavior: Cutting ages [age] -none since that time  Patient denies current fears or concerns for personal safety.  Patient reports the following current or recent suicidal ideation or behaviors: Occasional thoughts no plan at this time -she will tell her mother if thoughts become unmanageable and go to the emergency department as needed.  Patient denies current or recent homicidal ideation or behaviors.  Patient denies current or recent self injurious behavior or ideation.  Patient denies other safety concerns.  A safety and risk management plan has not been developed at this time, however patient was encouraged to call [place] [phone]  should there be a change in any of these risk factors.    Appearance:   Phone visit   Eye Contact:   Phone visit   Psychomotor Behavior: Phone visit   Attitude:   Cooperative  Attentive  Orientation:   All  Speech   Rate / Production: Normal    Volume:  Soft   Mood:    Anxious  Normal Sad   Affect:    Phone visit   Thought Content:  Clear   Thought Form:  Coherent  Logical   Insight:    Good     Diagnoses:  1. JOSEFINA (generalized anxiety disorder)    2. Moderate major depression (H)        Collateral Reports Completed:  Communicated with: Primary care provider as needed    Plan: (Homework, other):  Patient scheduled a follow up appointment with the clinic [facility] in 2 weeks.  She was also given deep Breathing Strategies to practice when experiencing anxiety.  CD Recommendations: No indications of CD issues.   PETE Stevens (Mindy ),[facility]

## 2020-11-11 NOTE — ED AVS SNAPSHOT
[facility] Emergency Department  [address] [address]  Phone:  [phone]  Fax:  [phone]                                    April Linda   MRN: [id number]    Department:  [facility] Emergency Department   Date of Visit:  6/9/2019           After Visit Summary Signature Page    I have received my discharge instructions, and my questions have been answered. I have discussed any challenges I see with this plan with the nurse or doctor.    ..........................................................................................................................................  Patient/Patient Representative Signature      ..........................................................................................................................................  Patient Representative Print Name and Relationship to Patient    ..................................................               ................................................  Date                                   Time    ..........................................................................................................................................  Reviewed by Signature/Title    ...................................................              ..............................................  Date                                               Time          22EPIC Rev 08/18       
Detail Level: Detailed
Instructions (Optional): Continue SRT

## 2021-01-10 ENCOUNTER — HEALTH MAINTENANCE LETTER (OUTPATIENT)
Age: 21
End: 2021-01-10

## 2021-02-19 ENCOUNTER — TELEPHONE (OUTPATIENT)
Dept: FAMILY MEDICINE | Facility: CLINIC | Age: 21
End: 2021-02-19

## 2021-02-23 ENCOUNTER — OFFICE VISIT (OUTPATIENT)
Dept: FAMILY MEDICINE | Facility: CLINIC | Age: 21
End: 2021-02-23
Payer: COMMERCIAL

## 2021-02-23 VITALS
WEIGHT: 244 LBS | DIASTOLIC BLOOD PRESSURE: 77 MMHG | HEART RATE: 75 BPM | OXYGEN SATURATION: 100 % | HEIGHT: 68 IN | SYSTOLIC BLOOD PRESSURE: 123 MMHG | TEMPERATURE: 98.5 F | BODY MASS INDEX: 36.98 KG/M2

## 2021-02-23 DIAGNOSIS — G47.00 PERSISTENT INSOMNIA: Primary | ICD-10-CM

## 2021-02-23 DIAGNOSIS — Z11.3 ROUTINE SCREENING FOR STI (SEXUALLY TRANSMITTED INFECTION): ICD-10-CM

## 2021-02-23 DIAGNOSIS — Z00.00 ROUTINE GENERAL MEDICAL EXAMINATION AT A HEALTH CARE FACILITY: ICD-10-CM

## 2021-02-23 DIAGNOSIS — Z12.4 SCREENING FOR CERVICAL CANCER: ICD-10-CM

## 2021-02-23 PROCEDURE — 87591 N.GONORRHOEAE DNA AMP PROB: CPT | Performed by: FAMILY MEDICINE

## 2021-02-23 PROCEDURE — 87491 CHLMYD TRACH DNA AMP PROBE: CPT | Performed by: FAMILY MEDICINE

## 2021-02-23 PROCEDURE — G0145 SCR C/V CYTO,THINLAYER,RESCR: HCPCS | Performed by: FAMILY MEDICINE

## 2021-02-23 PROCEDURE — 99395 PREV VISIT EST AGE 18-39: CPT | Performed by: FAMILY MEDICINE

## 2021-02-23 ASSESSMENT — ANXIETY QUESTIONNAIRES
5. BEING SO RESTLESS THAT IT IS HARD TO SIT STILL: SEVERAL DAYS
GAD7 TOTAL SCORE: 13
1. FEELING NERVOUS, ANXIOUS, OR ON EDGE: MORE THAN HALF THE DAYS
7. FEELING AFRAID AS IF SOMETHING AWFUL MIGHT HAPPEN: MORE THAN HALF THE DAYS
2. NOT BEING ABLE TO STOP OR CONTROL WORRYING: MORE THAN HALF THE DAYS
6. BECOMING EASILY ANNOYED OR IRRITABLE: MORE THAN HALF THE DAYS
3. WORRYING TOO MUCH ABOUT DIFFERENT THINGS: NEARLY EVERY DAY

## 2021-02-23 ASSESSMENT — PATIENT HEALTH QUESTIONNAIRE - PHQ9
SUM OF ALL RESPONSES TO PHQ QUESTIONS 1-9: 12
5. POOR APPETITE OR OVEREATING: SEVERAL DAYS

## 2021-02-23 ASSESSMENT — MIFFLIN-ST. JEOR: SCORE: 1918.85
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
## 2021-02-24 ASSESSMENT — ANXIETY QUESTIONNAIRES: GAD7 TOTAL SCORE: 13

## 2021-02-24 NOTE — RESULT ENCOUNTER NOTE
April,  Your lab results were normal/stable. Please feel free to my chart or call the office with questions. Vaishnavi Benjamin M.D.

## 2021-02-25 LAB
COPATH REPORT: NORMAL
PAP: NORMAL

## 2021-10-23 ENCOUNTER — HEALTH MAINTENANCE LETTER (OUTPATIENT)
Age: 21
End: 2021-10-23

## 2022-04-09 ENCOUNTER — HEALTH MAINTENANCE LETTER (OUTPATIENT)
Age: 22
End: 2022-04-09

## 2022-08-05 ENCOUNTER — E-VISIT (OUTPATIENT)
Dept: FAMILY MEDICINE | Facility: CLINIC | Age: 22
End: 2022-08-05
Payer: COMMERCIAL

## 2022-08-05 DIAGNOSIS — F33.1 MODERATE EPISODE OF RECURRENT MAJOR DEPRESSIVE DISORDER (H): Primary | ICD-10-CM

## 2022-08-05 PROCEDURE — 99207 PR NON-BILLABLE SERV PER CHARTING: CPT | Performed by: PHYSICIAN ASSISTANT

## 2022-08-10 ENCOUNTER — VIRTUAL VISIT (OUTPATIENT)
Dept: FAMILY MEDICINE | Facility: CLINIC | Age: 22
End: 2022-08-10
Payer: COMMERCIAL

## 2022-08-10 DIAGNOSIS — F39 MOOD DISORDER (H): Primary | ICD-10-CM

## 2022-08-10 DIAGNOSIS — F33.1 MODERATE EPISODE OF RECURRENT MAJOR DEPRESSIVE DISORDER (H): ICD-10-CM

## 2022-08-10 PROCEDURE — 99214 OFFICE O/P EST MOD 30 MIN: CPT | Mod: 95 | Performed by: PHYSICIAN ASSISTANT

## 2022-08-10 ASSESSMENT — PATIENT HEALTH QUESTIONNAIRE - PHQ9
10. IF YOU CHECKED OFF ANY PROBLEMS, HOW DIFFICULT HAVE THESE PROBLEMS MADE IT FOR YOU TO DO YOUR WORK, TAKE CARE OF THINGS AT HOME, OR GET ALONG WITH OTHER PEOPLE: VERY DIFFICULT
SUM OF ALL RESPONSES TO PHQ QUESTIONS 1-9: 17
SUM OF ALL RESPONSES TO PHQ QUESTIONS 1-9: 17

## 2022-08-10 NOTE — PROGRESS NOTES
April is a 21 year old who is being evaluated via a billable telephone visit.      What phone number would you like to be contacted at? [phone]  How would you like to obtain your AVS? Vikashhart    Assessment & Plan     ASSESSMENT/PLAN:      ICD-10-CM    1. Mood disorder (H)  F39    2. Moderate episode of recurrent major depressive disorder (H)  F33.1      Situational depression/anxiety with stressful life event. Patient declines therapy/medication at this time, would like to get through the situation. Recommended follow up if not improving or if any worsening. Letter written. Reach out to me as needed.    Return in about 1 month (around 9/10/2022) for if not improving or if worsening.    Meera Cordon PA-C  [facility]    Tahir Bush is a 21 year old, presenting for the following health issues:  Patient Request for Note/Letter (Needing letter for emotional support animal)      HPI   *  Would like letter for emotional support animal, she will be moving out on her own and all the apartments have no pet policy. Her animals help with her mental health.      Social History     Tobacco Use     Smoking status: Passive Smoke Exposure - Never Smoker     Smokeless tobacco: Never Used     Tobacco comment: Stepmother- outside and every other weekend with dad.    Substance Use Topics     Alcohol use: No     Drug use: No     PHQ 2/23/2021 8/10/2022 8/10/2022   PHQ-9 Total Score 12 17 17   Q9: Thoughts of better off dead/self-harm past 2 weeks Several days Not at all Not at all     JOSEFINA-7 SCORE 8/10/2020 2/23/2021 8/10/2022   Total Score - - -   Total Score - - 18 (severe anxiety)   Total Score 17 13 18     Last PHQ-9 8/10/2022   1.  Little interest or pleasure in doing things 2   2.  Feeling down, depressed, or hopeless 2   3.  Trouble falling or staying asleep, or sleeping too much 3   4.  Feeling tired or having little energy 1   5.  Poor appetite or overeating 3   6.  Feeling bad about yourself 2    7.  Trouble concentrating 3   8.  Moving slowly or restless 1   Q9: Thoughts of better off dead/self-harm past 2 weeks 0   PHQ-9 Total Score 17   Difficulty at work, home, or with people -     JOSEFINA-7  8/10/2022   1. Feeling nervous, anxious, or on edge 3   2. Not being able to stop or control worrying 3   3. Worrying too much about different things 2   4. Trouble relaxing 3   5. Being so restless that it is hard to sit still 2   6. Becoming easily annoyed or irritable 3   7. Feeling afraid, as if something awful might happen 2   JOSEFINA-7 Total Score 18   If you checked any problems, how difficult have they made it for you to do your work, take care of things at home, or get along with other people? Very difficult   Suicide Assessment Five-step Evaluation and Treatment (SAFE-T)    Family is getting evicted suddenly. This is very hard on them.  Family is moving in with other family, patient would like to move on her own. Looking into apartments that allow pets.  Declines medication/therapy referral at this time - She would like to give this time, prior to this situation she was doing well (especially because of her cats). This is a very stressful situation.  Support system: coworkers  Self care: petting cats, playing with the cats, reading  She denies any SI  Stopped OCP with break up 2 years ago, not sexually active        Review of Systems   Other than noted above, general, HEENT, respiratory, cardiac, MS, and gastrointestinal systems are negative.       Objective           Vitals:  No vitals were obtained today due to virtual visit.    Physical Exam   healthy, alert and no distress  PSYCH: Alert and oriented times 3; coherent speech, normal   rate and volume, able to articulate logical thoughts, able   to abstract reason, no tangential thoughts, no hallucinations   or delusions  Her affect is tearful   RESP: No cough, no audible wheezing, able to talk in full sentences  Remainder of exam unable to be completed due to  telephone visits        Phone call duration: 17 minutes    .  ..  Answers for HPI/ROS submitted by the patient on 8/10/2022  If you checked off any problems, how difficult have these problems made it for you to do your work, take care of things at home, or get along with other people?: Very difficult  PHQ9 TOTAL SCORE: 17

## 2022-08-10 NOTE — LETTER
[facility]  [address] [address]  Phone: [phone]    August 10, 2022        Rachelle Mckeon  [address]  [address]  [place] [address]          To whom it may concern:    RE: Rachelle Mckeon    This patient has been following with us for her healthcare since 2010. She is being followed for depression and anxiety. She has two cats that benefit her sleep, mood, and anxiety. I recommend that she be able to keep her cats with her in the apartment to benefit her mental health.    Please contact me for questions or concerns.      Sincerely,        Meera Cordon PA-C

## 2022-10-09 ENCOUNTER — HEALTH MAINTENANCE LETTER (OUTPATIENT)
Age: 22
End: 2022-10-09

## 2023-05-21 ENCOUNTER — HEALTH MAINTENANCE LETTER (OUTPATIENT)
Age: 23
End: 2023-05-21

## 2023-07-27 ENCOUNTER — OFFICE VISIT (OUTPATIENT)
Dept: MIDWIFE SERVICES | Facility: CLINIC | Age: 23
End: 2023-07-27
Payer: COMMERCIAL

## 2023-07-27 VITALS
HEIGHT: 68 IN | OXYGEN SATURATION: 98 % | SYSTOLIC BLOOD PRESSURE: 122 MMHG | DIASTOLIC BLOOD PRESSURE: 80 MMHG | HEART RATE: 68 BPM | WEIGHT: 225 LBS | BODY MASS INDEX: 34.1 KG/M2

## 2023-07-27 DIAGNOSIS — Z30.09 ENCOUNTER FOR GENERAL COUNSELING AND ADVICE ON CONTRACEPTIVE MANAGEMENT: Primary | ICD-10-CM

## 2023-07-27 DIAGNOSIS — Z11.3 ROUTINE SCREENING FOR STI (SEXUALLY TRANSMITTED INFECTION): ICD-10-CM

## 2023-07-27 DIAGNOSIS — Z30.011 ENCOUNTER FOR BCP (BIRTH CONTROL PILLS) INITIAL PRESCRIPTION: ICD-10-CM

## 2023-07-27 DIAGNOSIS — G43.109 MIGRAINE WITH AURA AND WITHOUT STATUS MIGRAINOSUS, NOT INTRACTABLE: ICD-10-CM

## 2023-07-27 DIAGNOSIS — Z30.011 ENCOUNTER FOR INITIAL PRESCRIPTION OF CONTRACEPTIVE PILLS: ICD-10-CM

## 2023-07-27 LAB
HBV SURFACE AG SERPL QL IA: NONREACTIVE
HCV AB SERPL QL IA: NONREACTIVE
HIV 1+2 AB+HIV1 P24 AG SERPL QL IA: NONREACTIVE
T PALLIDUM AB SER QL: NONREACTIVE

## 2023-07-27 PROCEDURE — 87491 CHLMYD TRACH DNA AMP PROBE: CPT | Performed by: ADVANCED PRACTICE MIDWIFE

## 2023-07-27 PROCEDURE — 86780 TREPONEMA PALLIDUM: CPT | Performed by: ADVANCED PRACTICE MIDWIFE

## 2023-07-27 PROCEDURE — 87591 N.GONORRHOEAE DNA AMP PROB: CPT | Performed by: ADVANCED PRACTICE MIDWIFE

## 2023-07-27 PROCEDURE — 36415 COLL VENOUS BLD VENIPUNCTURE: CPT | Performed by: ADVANCED PRACTICE MIDWIFE

## 2023-07-27 PROCEDURE — 86803 HEPATITIS C AB TEST: CPT | Performed by: ADVANCED PRACTICE MIDWIFE

## 2023-07-27 PROCEDURE — 99203 OFFICE O/P NEW LOW 30 MIN: CPT | Performed by: ADVANCED PRACTICE MIDWIFE

## 2023-07-27 PROCEDURE — 87340 HEPATITIS B SURFACE AG IA: CPT | Performed by: ADVANCED PRACTICE MIDWIFE

## 2023-07-27 PROCEDURE — 87389 HIV-1 AG W/HIV-1&-2 AB AG IA: CPT | Performed by: ADVANCED PRACTICE MIDWIFE

## 2023-07-27 RX ORDER — ACETAMINOPHEN AND CODEINE PHOSPHATE 120; 12 MG/5ML; MG/5ML
0.35 SOLUTION ORAL DAILY
Qty: 84 TABLET | Refills: 3 | Status: SHIPPED | OUTPATIENT
Start: 2023-07-27 | End: 2024-06-27

## 2023-07-27 ASSESSMENT — PATIENT HEALTH QUESTIONNAIRE - PHQ9: SUM OF ALL RESPONSES TO PHQ QUESTIONS 1-9: 16

## 2023-07-27 NOTE — PROGRESS NOTES
"SUBJECTIVE:  Rachelle Mckeon is a 22 year old female.  Chief Complaint   Patient presents with    Contraception     April is interested in learning about different forms of birth control. She has used the pill in the past when she was with a previous partner over a year ago. She is not currently sexually active but plans to be again with a new partner. They both agreed to get STI testing and she would like to be on birth control prior to beginning their sexual relationship. Shares a hx of migraine with aura, however no aura for about 1 year. She does take Imitrex prn. Starting to exercise more frequently and uses an inhaler prn. Denies hx of cancer, blood clots, smoking. No past surgeries. Has never been pregnant. Last pap was normal in 2021.    Menstrual cycle data: regular q \"20ish\" days, lasting 6 days, moderate in flow with some cramping.    Active diagnoses this visit   Routine screening for STI (sexually transmitted infection)  Encounter for initial prescription of contraceptive pills  Encounter for general counseling and advice on contraceptive management    Review Of Systems: negative except that which is obtained in the HPI.    Medical, surgical, OB histories, medications and allergies reviewed and updated.    OBJECTIVE:  /80 (BP Location: Right arm, Patient Position: Sitting, Cuff Size: Adult Regular)   Pulse 68   Ht 1.727 m (5' 8\")   Wt 102.1 kg (225 lb)   LMP 07/15/2023 (Exact Date)   SpO2 98%   BMI 34.21 kg/m      Exam:  Constitutional: healthy, alert, and no distress  Cardiovascular: Well perfused.  Respiratory: Breathing unlabored.  : Deferred  Musculoskeletal: extremities normal, no gross deformities noted, gait normal, and normal muscle tone  Skin: no suspicious lesions or rashes  Neurologic: Sensation grossly WNL.  Psychiatric: mentation appears normal and affect normal/bright    ASSESSMENT / PLAN:  (Z30.09) Encounter for general counseling and advice on contraceptive management  " (primary encounter diagnosis)  (Z30.011) Encounter for initial prescription of contraceptive pills  Comment: Discussed options that are safe for April utilizing demo kit and diagram. Advised on risks, benefits, and alternatives. After discussion, she would like to start the progestin only pill (POP). Shares she is a good pill taker. Advised on how to take with no days off and back up contraception or abstinence x 7 days. Discussed BTB possibility and will attach more info to AVS. Advised returning to clinic with any concerns or questions prn.  Plan: norethindrone (MICRONOR) 0.35 MG tablet         (Z11.3) Routine screening for STI (sexually transmitted infection)  Comment: Advised condoms for STI prevention.  Plan: HIV Antigen Antibody Combo, Hepatitis B surface antigen, Hepatitis C antibody, Treponema Abs w Reflex to RPR and Titer, Chlamydia trachomatis/Neisseria gonorrhoeae by PCR - Clinic Collect              40 minutes on the date of the encounter doing chart review, patient visit, and documentation    FELECIA German, PRERNA

## 2023-07-28 LAB
C TRACH DNA SPEC QL PROBE+SIG AMP: NEGATIVE
N GONORRHOEA DNA SPEC QL NAA+PROBE: NEGATIVE

## 2023-10-09 ENCOUNTER — ALLIED HEALTH/NURSE VISIT (OUTPATIENT)
Dept: FAMILY MEDICINE | Facility: CLINIC | Age: 23
End: 2023-10-09
Payer: COMMERCIAL

## 2023-10-09 ENCOUNTER — MYC MEDICAL ADVICE (OUTPATIENT)
Dept: FAMILY MEDICINE | Facility: CLINIC | Age: 23
End: 2023-10-09

## 2023-10-09 DIAGNOSIS — Z11.1 SCREENING EXAMINATION FOR PULMONARY TUBERCULOSIS: Primary | ICD-10-CM

## 2023-10-09 PROCEDURE — 86580 TB INTRADERMAL TEST: CPT

## 2023-10-09 PROCEDURE — 99207 PR NO CHARGE NURSE ONLY: CPT

## 2023-10-09 NOTE — PROGRESS NOTES
"Patient is here today for a Mantoux (TST) test placement. Mantoux placed in patient's right forearm at 2:18PM.    Is there a current order in the chart? No. Placed order according to standing order (reference the \"Skin Test- Tuberculosis Screening- Ambulatory Care\" standing order in Policy Tech). Review the Inclusion and Exclusion Criteria.    Inclusion Criteria  School or education institutional screening for healthcare workers and correctional facility staff - Administer two-step TST. Patient to return for second test in 1-3 weeks after first test is read.     Exclusion Criteria  None - Place order for Mantoux (TST) test per standing order.    Reason for Mantoux (TST) in patient's own words: is going to start school for phlebotomy and needs to get the mantoux done for TB  testing    Patient needs form signed? Yes- completed per clinic's forms process.    Instructed patient to wait for 15 minutes post injection and to report any reactions immediately to staff.    Told patient to return to clinic in 48-72 hours to have Mantoux (TST) read.     Luisana HUTCHINSON LPN on 10/9/2023 at 2:37 PM           "

## 2023-10-11 ENCOUNTER — ALLIED HEALTH/NURSE VISIT (OUTPATIENT)
Dept: FAMILY MEDICINE | Facility: CLINIC | Age: 23
End: 2023-10-11
Payer: COMMERCIAL

## 2023-10-11 DIAGNOSIS — Z11.1 SCREENING EXAMINATION FOR PULMONARY TUBERCULOSIS: Primary | ICD-10-CM

## 2023-10-11 LAB
PPDINDURATION: 0 MM (ref 0–4.99)
PPDREDNESS: NORMAL

## 2023-10-11 PROCEDURE — 99207 PR NO CHARGE NURSE ONLY: CPT

## 2023-10-11 NOTE — PROGRESS NOTES
Patient is here today for a Mantoux (TST) test results.    Did patient return to clinic 48-72 hours from Mantoux (TST) placement: Yes -     PPD Induration   Date Value Ref Range Status   10/11/2023 0 0 - 4.99 mm Final     PPD Redness   Date Value Ref Range Status   10/11/2023 Not Present  Final           Induration Size? Induration <5mm - Enter results in Enter/Edit Activity. Route results to ordering provider.     Patient needs form signed? No print out of results given to pt.    Patient reports having previously had the BCG Vaccine: No    Does patient need a two step?  Yes - placed order according to standing order or notified LP for need for next TST.  Instructed patient when to return to the clinic.  Pt will stop at  to schedule appt for next mantoux. Needs to return for second test in 1-3 weeks.    Dede Valdes RN

## 2023-10-18 ENCOUNTER — ALLIED HEALTH/NURSE VISIT (OUTPATIENT)
Dept: FAMILY MEDICINE | Facility: CLINIC | Age: 23
End: 2023-10-18
Payer: COMMERCIAL

## 2023-10-18 DIAGNOSIS — Z23 ENCOUNTER FOR IMMUNIZATION: Primary | ICD-10-CM

## 2023-10-18 PROCEDURE — 99207 PR NO CHARGE NURSE ONLY: CPT

## 2023-10-18 PROCEDURE — 86580 TB INTRADERMAL TEST: CPT

## 2023-10-18 NOTE — PROGRESS NOTES
"Patient is here today for a Mantoux (TST) test placement.    Is there a current order in the chart? No. Placed order according to standing order (reference the \"Skin Test- Tuberculosis Screening- Ambulatory Care\" standing order in Policy [employer]). Review the Inclusion and Exclusion Criteria.      Inclusion Criteria  School or education institutional screening for healthcare workers and correctional facility staff - Administer two-step TST. Patient to return for second test in 1-3 weeks after first test is read.     Exclusion Criteria  None - Place order for Mantoux (TST) test per standing order.    Reason for Mantoux (TST) in patient's own words: school requirement    Patient needs form signed? No - form not needed per patient.    Instructed patient to wait for 15 minutes post injection and to report any reactions immediately to staff.    Told patient to return to clinic in 48-72 hours to have Mantoux (TST) read.               "

## 2023-10-20 ENCOUNTER — ALLIED HEALTH/NURSE VISIT (OUTPATIENT)
Dept: FAMILY MEDICINE | Facility: CLINIC | Age: 23
End: 2023-10-20
Payer: COMMERCIAL

## 2023-10-20 DIAGNOSIS — Z11.1 SCREENING EXAMINATION FOR PULMONARY TUBERCULOSIS: Primary | ICD-10-CM

## 2023-10-20 LAB
PPDINDURATION: 0 MM (ref 0–4.99)
PPDREDNESS: NORMAL

## 2023-10-20 PROCEDURE — 99207 PR NO CHARGE NURSE ONLY: CPT

## 2023-10-20 NOTE — PROGRESS NOTES
Patient is here today for a Mantoux (TST) test results.    Did patient return to clinic 48-72 hours from Mantoux (TST) placement: Yes -     PPD Induration   Date Value Ref Range Status   10/20/2023 0 0 - 4.99 mm Final     PPD Redness   Date Value Ref Range Status   10/20/2023 Not Present  Final           Induration Size? Induration <5mm - Enter results in Enter/Edit Activity. Route results to ordering provider.     Patient needs form signed? No printed copy of results for pt.    Patient reports having previously had the BCG Vaccine:     Does patient need a two step? No this was the 2nd mantoux    [facility] KRISTIN

## 2023-11-27 ENCOUNTER — HOSPITAL ENCOUNTER (EMERGENCY)
Facility: CLINIC | Age: 23
Discharge: HOME | End: 2023-11-27
Attending: EMERGENCY MEDICINE | Admitting: EMERGENCY MEDICINE
Payer: COMMERCIAL

## 2023-11-27 VITALS
HEIGHT: 68 IN | RESPIRATION RATE: 18 BRPM | TEMPERATURE: 98.4 F | OXYGEN SATURATION: 98 % | BODY MASS INDEX: 33.34 KG/M2 | SYSTOLIC BLOOD PRESSURE: 107 MMHG | HEART RATE: 86 BPM | WEIGHT: 220 LBS | DIASTOLIC BLOOD PRESSURE: 61 MMHG

## 2023-11-27 DIAGNOSIS — R50.9 ACUTE FEBRILE ILLNESS: ICD-10-CM

## 2023-11-27 DIAGNOSIS — H92.03 ACUTE EAR PAIN, BILATERAL: ICD-10-CM

## 2023-11-27 LAB
FLUAV RNA SPEC QL NAA+PROBE: NEGATIVE
FLUBV RNA RESP QL NAA+PROBE: NEGATIVE
HOLD SPECIMEN: NORMAL
RSV RNA SPEC NAA+PROBE: NEGATIVE
SARS-COV-2 RNA RESP QL NAA+PROBE: NEGATIVE

## 2023-11-27 PROCEDURE — 96361 HYDRATE IV INFUSION ADD-ON: CPT

## 2023-11-27 PROCEDURE — 250N000013 HC RX MED GY IP 250 OP 250 PS 637: Performed by: EMERGENCY MEDICINE

## 2023-11-27 PROCEDURE — 87637 SARSCOV2&INF A&B&RSV AMP PRB: CPT | Performed by: EMERGENCY MEDICINE

## 2023-11-27 PROCEDURE — 96374 THER/PROPH/DIAG INJ IV PUSH: CPT

## 2023-11-27 PROCEDURE — 250N000011 HC RX IP 250 OP 636: Mod: JZ | Performed by: EMERGENCY MEDICINE

## 2023-11-27 PROCEDURE — 99284 EMERGENCY DEPT VISIT MOD MDM: CPT | Mod: 25

## 2023-11-27 PROCEDURE — 258N000003 HC RX IP 258 OP 636: Performed by: EMERGENCY MEDICINE

## 2023-11-27 RX ORDER — ACETAMINOPHEN 325 MG/1
975 TABLET ORAL ONCE
Status: COMPLETED | OUTPATIENT
Start: 2023-11-27 | End: 2023-11-27

## 2023-11-27 RX ORDER — KETOROLAC TROMETHAMINE 15 MG/ML
10 INJECTION, SOLUTION INTRAMUSCULAR; INTRAVENOUS
Status: COMPLETED | OUTPATIENT
Start: 2023-11-27 | End: 2023-11-27

## 2023-11-27 RX ORDER — ACETAMINOPHEN 500 MG
1000 TABLET ORAL ONCE
Status: DISCONTINUED | OUTPATIENT
Start: 2023-11-27 | End: 2023-11-27

## 2023-11-27 RX ADMIN — SODIUM CHLORIDE, POTASSIUM CHLORIDE, SODIUM LACTATE AND CALCIUM CHLORIDE 1000 ML: 600; 310; 30; 20 INJECTION, SOLUTION INTRAVENOUS at 06:42

## 2023-11-27 RX ADMIN — KETOROLAC TROMETHAMINE 10 MG: 15 INJECTION, SOLUTION INTRAMUSCULAR; INTRAVENOUS at 06:43

## 2023-11-27 RX ADMIN — ACETAMINOPHEN 975 MG: 325 TABLET, FILM COATED ORAL at 06:28

## 2023-11-27 ASSESSMENT — ENCOUNTER SYMPTOMS
HEADACHES: 1
MUSCULOSKELETAL NEGATIVE: 1
RESPIRATORY NEGATIVE: 1
ENDOCRINE NEGATIVE: 1
EYES NEGATIVE: 1
CARDIOVASCULAR NEGATIVE: 1
FEVER: 1
GASTROINTESTINAL NEGATIVE: 1
ALLERGIC/IMMUNOLOGIC NEGATIVE: 1
PSYCHIATRIC NEGATIVE: 1
HEMATOLOGIC/LYMPHATIC NEGATIVE: 1
WEAKNESS: 1

## 2023-11-27 ASSESSMENT — ACTIVITIES OF DAILY LIVING (ADL)
ADLS_ACUITY_SCORE: 35
ADLS_ACUITY_SCORE: 35

## 2023-11-27 NOTE — DISCHARGE INSTRUCTIONS
1) Your today did not reveal a definite diagnosis or cause for your symptoms.  We discussed given bilateral headache, earache, fever that symptoms are likely viral in nature.  We discussed the possibility of meningitis and agreed that the suspicion for meningitis is low and that further workup for meningitis including lumbar puncture and blood work was not necessary at this time.    2) We have reviewed monitoring for neck pain or stiffness, extremity weakness or numbness.  We have discussed staying hydrated drinking lots of fluids, and managing fever with Tylenol 1000 mg every 8 hours if needed or Motrin or ibuprofen or Advil with food 400 mg every 12 hours as needed.    3) You appear stable for discharge to home at this time however if you develop new concerns as discussed and reviewed you should return to be reevaluated

## 2023-11-27 NOTE — ED PROVIDER NOTES
History     Chief Complaint   Patient presents with    Fever    Headache     HPI  Rachelle Mckeon is a 23 year old female who presents for evaluation with report of fever and headache.  Patient reports that she has had fever for the last 3 days and bilateral ear pain.  She also reports intermittent blurriness in her vision.    Medical records show history of anxiety, mood disorder, moderate depression, migraine.  Prescribed indications reviewed including Flonase, albuterol, Imitrex and Micronor.    On examination patient right along by car from holding for sleep.  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  She lives with her boyfriend who is currently asymptomatic.  Over the weekend she developed constellation of symptoms including bitemporal headache, reporting 2 episodes of blurry vision and that if she exerts herself too quickly she feels does not pass out.  She also notes a sensation of ear fullness with bilateral ear pain.  No sore throat.  Reports tender lymph nodes.  She has noted no rash.  She has had no cough.  She reports no shortness of breath at rest that she has no chest pain with exertion or activity.  She also reports no palpitations.  She reports a temp this morning of 104F she elected to come in to be evaluated her menstrual period began today.  Patient reports no neck pain no recent neck trauma or manipulation.    Allergies:  Allergies   Allergen Reactions    Mold     Wool Fiber Hives       Problem List:    Patient Active Problem List    Diagnosis Date Noted    Encounter for BCP (birth control pills) initial prescription 07/27/2023     Priority: Medium    Migraine with aura and without status migrainosus, not intractable 12/06/2017     Priority: Medium    Mood disorder (H24) 12/07/2015     Priority: Medium    Health Care Home 02/28/2014     Priority: Medium     EMERGENCY CARE PLAN  February 28, 2014: No current Care Coordination follow up  planned. Please refer if Care Coordination services are needed.    Presenting Problem Signs and Symptoms Treatment Plan   Questions or concerns   during clinic hours   I will call my clinic directly:  [facility]  [address] [place] [address]  [phone].    Questions or concerns outside clinic hours   I will call the 24 hour nurse line at   [phone] or [phone].   Need to schedule an appointment   I will call the 24 hour scheduling team at [phone] or my clinic directly at [phone].    Same day treatment     I will call my clinic first, nurse line if after hours, urgent care and express care if needed.     Clinic care coordination services (regular clinic hours)     I will call a clinic care coordinator directly:     Mateo Hooks RN  Mon, Tues, Fri - [phone]  Wed, Thurs - [phone]    Elaina Fuentes, :    [phone]    Or call my clinic at [phone] and ask to speak with care coordination.     Crisis Services: Behavioral or Mental Health  [facility] 24 Hour Phone Line  [phone]    [facility] 24 Hour Crisis Services  [phone]    [facility] [phone]    [facility]   [phone]         Emergency treatment -- Immediately    [facility] 911       Moderate major depression (H) 02/28/2014     Priority: Medium    Anxiety 02/28/2014     Priority: Medium        Past Medical History:    Past Medical History:   Diagnosis Date    ADHD (attention deficit hyperactivity disorder)     Anxiety and depression        Past Surgical History:    Past Surgical History:   Procedure Laterality Date    NO HISTORY OF SURGERY         Family History:    Family History   Problem Relation Age of Onset    No Known Problems Mother     No Known Problems Father     No Known Problems Sister     Breast Cancer Paternal Grandmother     No Known Problems Paternal Grandfather     No Known Problems Paternal Half-Sister        Social  "History:  Marital Status:  Single [1]  Social History     Tobacco Use    Smoking status: Never     Passive exposure: Never    Smokeless tobacco: Never    Tobacco comments:     Stepmother- outside and every other weekend with dad.    Substance Use Topics    Alcohol use: Yes     Comment: Occ    Drug use: No        Medications:    albuterol (PROAIR HFA/PROVENTIL HFA/VENTOLIN HFA) 108 (90 Base) MCG/ACT inhaler  Cholecalciferol (VITAMIN D PO)  fluticasone (FLONASE) 50 MCG/ACT nasal spray  norethindrone (MICRONOR) 0.35 MG tablet  SUMAtriptan (IMITREX) 25 MG tablet          Review of Systems   Constitutional:  Positive for fever.   HENT:  Positive for ear pain.    Eyes: Negative.    Respiratory: Negative.     Cardiovascular: Negative.    Gastrointestinal: Negative.    Endocrine: Negative.    Genitourinary: Negative.    Musculoskeletal: Negative.    Skin: Negative.    Allergic/Immunologic: Negative.    Neurological:  Positive for weakness and headaches.   Hematological: Negative.    Psychiatric/Behavioral: Negative.     All other systems reviewed and are negative.      Physical Exam   BP: 125/75  Pulse: 104  Temp: (!) 102.5  F (39.2  C)  Resp: 18  Height: 172.7 cm (5' 8\")  Weight: 99.8 kg (220 lb)  SpO2: 95 %      Physical Exam  Constitutional:       General: She is not in acute distress.     Appearance: Normal appearance. She is not ill-appearing, toxic-appearing or diaphoretic.   HENT:      Head: Normocephalic and atraumatic.      Right Ear: Tympanic membrane normal.      Left Ear: Tympanic membrane normal.      Nose: Nose normal.   Eyes:      Extraocular Movements: Extraocular movements intact.      Pupils: Pupils are equal, round, and reactive to light.   Cardiovascular:      Rate and Rhythm: Regular rhythm. Tachycardia present.   Pulmonary:      Effort: Pulmonary effort is normal.   Musculoskeletal:         General: No swelling, tenderness, deformity or signs of injury.      Cervical back: Normal range of motion.      " Right lower leg: No edema.      Left lower leg: No edema.   Lymphadenopathy:      Cervical: Cervical adenopathy present.   Skin:     Capillary Refill: Capillary refill takes less than 2 seconds.      Coloration: Skin is not jaundiced or pale.      Findings: No bruising, erythema, lesion or rash.   Neurological:      General: No focal deficit present.      Mental Status: She is alert and oriented to person, place, and time.      Cranial Nerves: No cranial nerve deficit.      Sensory: No sensory deficit.      Motor: No weakness.      Coordination: Coordination normal.      Gait: Gait normal.      Deep Tendon Reflexes: Reflexes normal.   Psychiatric:         Mood and Affect: Mood normal.         Behavior: Behavior normal.         Thought Content: Thought content normal.         Judgment: Judgment normal.         ED Course                 Procedures              Critical Care time:  none               ED medications:  Medications   acetaminophen (TYLENOL) tablet 975 mg (975 mg Oral $Given 11/27/23 0628)   ketorolac (TORADOL) injection 10 mg (10 mg Intravenous $Given 11/27/23 0643)   lactated ringers BOLUS 1,000 mL (0 mLs Intravenous Stopped 11/27/23 0739)       ED Vitals:  Vitals:    11/27/23 0805 11/27/23 0808 11/27/23 0830 11/27/23 0900   BP:       Pulse:       Resp:       Temp:  99.6  F (37.6  C)     TempSrc:  Tympanic     SpO2: 96%  96% 96%   Weight:       Height:          ED labs and imaging:  Results for orders placed or performed during the hospital encounter of 11/27/23   Symptomatic Influenza A/B, RSV, & SARS-CoV2 PCR (COVID-19) Nose     Status: Normal    Specimen: Nose; Swab   Result Value Ref Range    Influenza A PCR Negative Negative    Influenza B PCR Negative Negative    RSV PCR Negative Negative    SARS CoV2 PCR Negative Negative    Narrative    Testing was performed using the Xpert Xpress CoV2/Flu/RSV Assay on the Cepheid GeneXpert Instrument. This test should be ordered for the detection of SARS-CoV-2,  influenza, and RSV viruses in individuals who meet clinical and/or epidemiological criteria. Test performance is unknown in asymptomatic patients. This test is for in vitro diagnostic use under the FDA EUA for laboratories certified under CLIA to perform high or moderate complexity testing. This test has not been FDA cleared or approved. A negative result does not rule out the presence of PCR inhibitors in the specimen or target RNA in concentration below the limit of detection for the assay. If only one viral target is positive but coinfection with multiple targets is suspected, the sample should be re-tested with another FDA cleared, approved, or authorized test, if coinfection would change clinical management. This test was validated by the [facility]. These laboratories are certified under the Clinical Laboratory Improvement Amendments of 1988 (CLIA-88) as qualified to perform high complexity laboratory testing.   [place] Draw     Status: None    Narrative    The following orders were created for panel order [place] Draw.  Procedure                               Abnormality         Status                     ---------                               -----------         ------                     Extra Blue Top Tube[[id number]]                              Final result               Extra Red Top Tube[[id number]]                               Final result               Extra Green Top (Lithium...[[id number]]                      Final result               Extra Purple Top Tube[[id number]]                            Final result                 Please view results for these tests on the individual orders.   Extra Blue Top Tube     Status: None   Result Value Ref Range    Hold Specimen [facility]    Extra Red Top Tube     Status: None   Result Value Ref Range    Hold Specimen [facility]    Extra Green Top (Lithium Heparin) Tube     Status: None   Result Value Ref Range    Hold Specimen [facility]    Extra Purple Top Tube      Status: None   Result Value Ref Range    Hold Specimen [facility]         Assessments & Plan (with Medical Decision Making)   Assessment Summary and Clinical Impression: 23-year-old female present with 3 days of headache, bilateral ear pain and headache.  She reported some blurry vision intermittently.  She was To be febrile arrival temp was 39.2.  Tachycardic likely due to her febrile illness.  She was normotensive.  95% on room air.  She has a history of anxiety disorder and migraine headaches.  On exam she appeared ill but not toxic.  Normal voice.  She had tender shotty cervical adenopathy left posterior chain worse than right.  No trismus.  TMs are clear bilaterally.  No neck pain.  Normal range of motion of the neck.  No extremity weakness or numbness reported. Lungs are clear to auscultation.  She was in no respiratory distress.  Given constellation of symptoms in the last 3 days she was offered therapies and medications to manage her symptoms and stepwise workup undertaken.  Viral respiratory panel test was negative.  After reviewing care goals and options for further workup given symptoms of uncertain cause with suspicion that her febrile illness was due to a viral illness patient expressed comfort with watchful waiting with plan to return if her symptoms worsen or if there are new concerns.    ED course and plan:  Reviewed the medical record.  She was offered antipyretics for her fever on arrival.  Prior neuroimaging including brain MRI from 12/8/2017 and CT head from 10/12/2017 reviewed given constellation of symptoms in the last 3 days stepwise evaluation and treatment was initiated.  She was given medications to manage her symptoms. Viral respiratory panel testing was negative for COVID RSV and influenza   Reviewed broaden her workup given her constellation of symptoms although her exam and history did not raise concern for meningitis or encephalitis.  We discussed blood work and lumbar puncture.  After  reviewing options for care including further testing patient expressed comfort going home given that her fever defervesced she was feeling much improved.  She is discharged with suspicion that symptoms could be related to a viral illness although the exact etiology is not certain.  Reviewed antipyretic management and concerning symptoms including but not limited to neck pain or stiffness, persistent headache, if she develops extremity weakness or numbness or any new concerns.  Patient expressed comfort, understanding and agreement with the plan of care      Disclaimer: This note consists of symbols derived from keyboarding, dictation and/or voice recognition software. As a result, there may be errors in the script that have gone undetected. Please consider this when interpreting information found in this chart.   I have reviewed the nursing notes.    I have reviewed the findings, diagnosis, plan and need for follow up with the patient.           Medical Decision Making  The patient's presentation was of moderate complexity (an acute illness with systemic symptoms).    The patient's evaluation involved:  ordering and/or review of 2 test(s) in this encounter (diagnostic imaging and lab)    The patient's management necessitated moderate risk (prescription drug management including medications given in the ED).        New Prescriptions    No medications on file       Final diagnoses:   Acute febrile illness   Acute ear pain, bilateral       11/27/2023   [facility] EMERGENCY DEPT       Sebastien Langford MD  11/27/23 3093

## 2023-11-27 NOTE — ED TRIAGE NOTES
Patient presents with ongoing fever for around 3 days. Reports bilateral ear pain and headache. Denies shortness of breath and cough. Reports intermittent blurriness in vision.     Triage Assessment (Adult)       Row Name 11/27/23 0615          Triage Assessment    Airway WDL WDL        Respiratory WDL    Respiratory WDL WDL        Skin Circulation/Temperature WDL    Skin Circulation/Temperature WDL WDL        Cardiac WDL    Cardiac WDL WDL        Peripheral/Neurovascular WDL    Peripheral Neurovascular WDL WDL        Cognitive/Neuro/Behavioral WDL    Cognitive/Neuro/Behavioral WDL WDL

## 2024-05-25 ENCOUNTER — HEALTH MAINTENANCE LETTER (OUTPATIENT)
Age: 24
End: 2024-05-25

## 2024-06-26 DIAGNOSIS — Z30.09 ENCOUNTER FOR GENERAL COUNSELING AND ADVICE ON CONTRACEPTIVE MANAGEMENT: ICD-10-CM

## 2024-06-26 DIAGNOSIS — Z30.011 ENCOUNTER FOR INITIAL PRESCRIPTION OF CONTRACEPTIVE PILLS: ICD-10-CM

## 2024-06-27 RX ORDER — ACETAMINOPHEN AND CODEINE PHOSPHATE 120; 12 MG/5ML; MG/5ML
0.35 SOLUTION ORAL DAILY
Qty: 84 TABLET | Refills: 0 | Status: SHIPPED | OUTPATIENT
Start: 2024-06-27 | End: 2024-08-10

## 2024-06-27 NOTE — TELEPHONE ENCOUNTER
Refill request received from [facility].  Medication requested:    Pending Prescriptions:                       Disp   Refills    norethindrone (MICRONOR) 0.35 MG tablet [*84 tab*0            Sig: TAKE 1 TABLET BY MOUTH DAILY    Medication last prescribed: 7/27/23  Last visit with [facility] CN group: 7/27/23  Upcoming appointment(s): None    Refill request routed to on-call CNM for review.

## 2024-08-10 ENCOUNTER — MYC REFILL (OUTPATIENT)
Dept: MIDWIFE SERVICES | Facility: CLINIC | Age: 24
End: 2024-08-10
Payer: COMMERCIAL

## 2024-08-10 DIAGNOSIS — Z30.09 ENCOUNTER FOR GENERAL COUNSELING AND ADVICE ON CONTRACEPTIVE MANAGEMENT: ICD-10-CM

## 2024-08-10 DIAGNOSIS — Z30.011 ENCOUNTER FOR INITIAL PRESCRIPTION OF CONTRACEPTIVE PILLS: ICD-10-CM

## 2024-08-12 RX ORDER — ACETAMINOPHEN AND CODEINE PHOSPHATE 120; 12 MG/5ML; MG/5ML
0.35 SOLUTION ORAL DAILY
Qty: 84 TABLET | Refills: 4 | Status: SHIPPED | OUTPATIENT
Start: 2024-08-12

## 2025-06-14 ENCOUNTER — HEALTH MAINTENANCE LETTER (OUTPATIENT)
Age: 25
End: 2025-06-14